# Patient Record
Sex: MALE | Race: WHITE | Employment: FULL TIME | URBAN - NONMETROPOLITAN AREA
[De-identification: names, ages, dates, MRNs, and addresses within clinical notes are randomized per-mention and may not be internally consistent; named-entity substitution may affect disease eponyms.]

---

## 2017-05-10 DIAGNOSIS — T78.40XD ALLERGIC REACTION, SUBSEQUENT ENCOUNTER: ICD-10-CM

## 2017-05-10 RX ORDER — CETIRIZINE HYDROCHLORIDE 10 MG/1
10 TABLET ORAL DAILY
Qty: 30 TABLET | Refills: 1 | Status: SHIPPED | OUTPATIENT
Start: 2017-05-10 | End: 2017-06-09 | Stop reason: SDUPTHER

## 2017-05-10 RX ORDER — MONTELUKAST SODIUM 10 MG/1
10 TABLET ORAL DAILY
Qty: 30 TABLET | Refills: 1 | Status: SHIPPED | OUTPATIENT
Start: 2017-05-10 | End: 2017-06-09 | Stop reason: SDUPTHER

## 2017-05-24 ENCOUNTER — OFFICE VISIT (OUTPATIENT)
Dept: PRIMARY CARE CLINIC | Age: 37
End: 2017-05-24
Payer: COMMERCIAL

## 2017-05-24 VITALS — OXYGEN SATURATION: 98 % | SYSTOLIC BLOOD PRESSURE: 154 MMHG | HEART RATE: 130 BPM | DIASTOLIC BLOOD PRESSURE: 92 MMHG

## 2017-05-24 DIAGNOSIS — T78.2XXA ANAPHYLAXIS, INITIAL ENCOUNTER: Primary | ICD-10-CM

## 2017-05-24 DIAGNOSIS — T78.3XXA ANGIOEDEMA, INITIAL ENCOUNTER: ICD-10-CM

## 2017-05-24 PROCEDURE — 96372 THER/PROPH/DIAG INJ SC/IM: CPT | Performed by: NURSE PRACTITIONER

## 2017-05-24 PROCEDURE — 99215 OFFICE O/P EST HI 40 MIN: CPT | Performed by: NURSE PRACTITIONER

## 2017-05-24 RX ORDER — DIPHENHYDRAMINE HYDROCHLORIDE 50 MG/ML
50 INJECTION INTRAMUSCULAR; INTRAVENOUS ONCE
Status: COMPLETED | OUTPATIENT
Start: 2017-05-24 | End: 2017-05-24

## 2017-05-24 RX ORDER — DEXAMETHASONE SODIUM PHOSPHATE 4 MG/ML
10 INJECTION, SOLUTION INTRA-ARTICULAR; INTRALESIONAL; INTRAMUSCULAR; INTRAVENOUS; SOFT TISSUE ONCE
Status: COMPLETED | OUTPATIENT
Start: 2017-05-24 | End: 2017-05-24

## 2017-05-24 RX ORDER — EPINEPHRINE 0.3 MG/.3ML
0.3 INJECTION SUBCUTANEOUS ONCE
Status: COMPLETED | OUTPATIENT
Start: 2017-05-24 | End: 2017-05-24

## 2017-05-24 RX ADMIN — Medication 50 MG: at 16:11

## 2017-05-24 RX ADMIN — DEXAMETHASONE SODIUM PHOSPHATE 10 MG: 4 INJECTION, SOLUTION INTRA-ARTICULAR; INTRALESIONAL; INTRAMUSCULAR; INTRAVENOUS; SOFT TISSUE at 15:59

## 2017-05-24 RX ADMIN — DIPHENHYDRAMINE HYDROCHLORIDE 50 MG: 50 INJECTION INTRAMUSCULAR; INTRAVENOUS at 16:06

## 2017-05-24 RX ADMIN — EPINEPHRINE 0.3 MG: 0.3 INJECTION SUBCUTANEOUS at 16:12

## 2017-05-24 ASSESSMENT — ENCOUNTER SYMPTOMS
SHORTNESS OF BREATH: 1
VOICE CHANGE: 1

## 2017-06-09 DIAGNOSIS — T78.40XD ALLERGIC REACTION, SUBSEQUENT ENCOUNTER: ICD-10-CM

## 2017-06-09 RX ORDER — MONTELUKAST SODIUM 10 MG/1
10 TABLET ORAL DAILY
Qty: 30 TABLET | Refills: 1 | Status: SHIPPED | OUTPATIENT
Start: 2017-06-09 | End: 2017-06-14 | Stop reason: SDUPTHER

## 2017-06-09 RX ORDER — CETIRIZINE HYDROCHLORIDE 10 MG/1
10 TABLET ORAL DAILY
Qty: 30 TABLET | Refills: 1 | Status: SHIPPED | OUTPATIENT
Start: 2017-06-09 | End: 2017-06-14 | Stop reason: SDUPTHER

## 2017-06-14 ENCOUNTER — OFFICE VISIT (OUTPATIENT)
Dept: PRIMARY CARE CLINIC | Age: 37
End: 2017-06-14
Payer: COMMERCIAL

## 2017-06-14 VITALS
TEMPERATURE: 96.8 F | HEART RATE: 70 BPM | OXYGEN SATURATION: 97 % | WEIGHT: 315 LBS | DIASTOLIC BLOOD PRESSURE: 84 MMHG | SYSTOLIC BLOOD PRESSURE: 132 MMHG | BODY MASS INDEX: 41.75 KG/M2 | HEIGHT: 73 IN

## 2017-06-14 DIAGNOSIS — B37.9 YEAST INFECTION: ICD-10-CM

## 2017-06-14 DIAGNOSIS — R21 RASH: ICD-10-CM

## 2017-06-14 DIAGNOSIS — T78.40XD ALLERGIC REACTION, SUBSEQUENT ENCOUNTER: Primary | ICD-10-CM

## 2017-06-14 PROCEDURE — 99213 OFFICE O/P EST LOW 20 MIN: CPT | Performed by: NURSE PRACTITIONER

## 2017-06-14 RX ORDER — CETIRIZINE HYDROCHLORIDE 10 MG/1
10 TABLET ORAL DAILY
Qty: 30 TABLET | Refills: 11 | Status: SHIPPED | OUTPATIENT
Start: 2017-06-14 | End: 2018-05-31 | Stop reason: SDUPTHER

## 2017-06-14 RX ORDER — PREDNISONE 10 MG/1
TABLET ORAL
Qty: 43 TABLET | Refills: 0 | Status: SHIPPED | OUTPATIENT
Start: 2017-06-14 | End: 2017-08-08 | Stop reason: ALTCHOICE

## 2017-06-14 RX ORDER — MONTELUKAST SODIUM 10 MG/1
10 TABLET ORAL DAILY
Qty: 30 TABLET | Refills: 11 | Status: SHIPPED | OUTPATIENT
Start: 2017-06-14 | End: 2018-05-31 | Stop reason: SDUPTHER

## 2017-06-14 RX ORDER — EPINEPHRINE 0.3 MG/.3ML
0.3 INJECTION SUBCUTANEOUS ONCE
Qty: 0.3 ML | Refills: 3 | Status: SHIPPED | OUTPATIENT
Start: 2017-06-14 | End: 2022-06-14

## 2017-06-14 RX ORDER — RANITIDINE 300 MG/1
300 TABLET ORAL NIGHTLY
Qty: 30 TABLET | Refills: 11 | Status: SHIPPED | OUTPATIENT
Start: 2017-06-14 | End: 2018-04-25 | Stop reason: ALTCHOICE

## 2017-06-14 RX ORDER — NYSTATIN 100000 [USP'U]/G
POWDER TOPICAL 3 TIMES DAILY
Qty: 1 BOTTLE | Refills: 3 | Status: SHIPPED | OUTPATIENT
Start: 2017-06-14 | End: 2020-04-21 | Stop reason: SDUPTHER

## 2017-06-14 ASSESSMENT — ENCOUNTER SYMPTOMS
DIARRHEA: 0
WHEEZING: 0
COUGH: 0
NAUSEA: 0
EYE PAIN: 0
SORE THROAT: 0
EYE REDNESS: 0
BLOOD IN STOOL: 0
CONSTIPATION: 0
ABDOMINAL PAIN: 0
EYE ITCHING: 0
SINUS PRESSURE: 0
TROUBLE SWALLOWING: 0
VOMITING: 0
SHORTNESS OF BREATH: 0
EYE DISCHARGE: 0
CHEST TIGHTNESS: 0
BACK PAIN: 0

## 2017-06-23 RX ORDER — METHYLPREDNISOLONE 4 MG/1
TABLET ORAL
Qty: 1 KIT | Refills: 0 | Status: SHIPPED | OUTPATIENT
Start: 2017-06-23 | End: 2017-06-29

## 2017-08-03 ENCOUNTER — TELEPHONE (OUTPATIENT)
Dept: PRIMARY CARE CLINIC | Age: 37
End: 2017-08-03

## 2017-08-08 ENCOUNTER — OFFICE VISIT (OUTPATIENT)
Dept: PRIMARY CARE CLINIC | Age: 37
End: 2017-08-08
Payer: COMMERCIAL

## 2017-08-08 VITALS
BODY MASS INDEX: 41.75 KG/M2 | DIASTOLIC BLOOD PRESSURE: 86 MMHG | HEIGHT: 73 IN | TEMPERATURE: 98.7 F | WEIGHT: 315 LBS | HEART RATE: 76 BPM | SYSTOLIC BLOOD PRESSURE: 134 MMHG | OXYGEN SATURATION: 98 %

## 2017-08-08 DIAGNOSIS — J30.81 ALLERGIC RHINITIS DUE TO ANIMAL HAIR AND DANDER: Primary | ICD-10-CM

## 2017-08-08 DIAGNOSIS — L50.9 URTICARIAL RASH: ICD-10-CM

## 2017-08-08 PROCEDURE — 99213 OFFICE O/P EST LOW 20 MIN: CPT | Performed by: NURSE PRACTITIONER

## 2017-08-08 PROCEDURE — 96372 THER/PROPH/DIAG INJ SC/IM: CPT | Performed by: NURSE PRACTITIONER

## 2017-08-08 RX ORDER — DEXAMETHASONE SODIUM PHOSPHATE 4 MG/ML
10 INJECTION, SOLUTION INTRA-ARTICULAR; INTRALESIONAL; INTRAMUSCULAR; INTRAVENOUS; SOFT TISSUE ONCE
Status: COMPLETED | OUTPATIENT
Start: 2017-08-08 | End: 2017-08-08

## 2017-08-08 RX ORDER — HYDROXYZINE PAMOATE 25 MG/1
25 CAPSULE ORAL 3 TIMES DAILY PRN
Qty: 60 CAPSULE | Refills: 1 | Status: SHIPPED | OUTPATIENT
Start: 2017-08-08 | End: 2017-08-22

## 2017-08-08 RX ORDER — PREDNISONE 10 MG/1
10 TABLET ORAL DAILY
Qty: 42 TABLET | Refills: 0 | Status: SHIPPED | OUTPATIENT
Start: 2017-08-08 | End: 2017-08-18

## 2017-08-08 RX ADMIN — DEXAMETHASONE SODIUM PHOSPHATE 10 MG: 4 INJECTION, SOLUTION INTRA-ARTICULAR; INTRALESIONAL; INTRAMUSCULAR; INTRAVENOUS; SOFT TISSUE at 09:04

## 2017-08-08 ASSESSMENT — ENCOUNTER SYMPTOMS
VOMITING: 0
CONSTIPATION: 0
COUGH: 0
RHINORRHEA: 0
DIARRHEA: 0
EYE REDNESS: 0
SORE THROAT: 0
SHORTNESS OF BREATH: 0

## 2017-08-21 ENCOUNTER — TELEPHONE (OUTPATIENT)
Dept: PRIMARY CARE CLINIC | Age: 37
End: 2017-08-21

## 2017-08-31 ENCOUNTER — TELEPHONE (OUTPATIENT)
Dept: PRIMARY CARE CLINIC | Age: 37
End: 2017-08-31

## 2018-04-25 ENCOUNTER — OFFICE VISIT (OUTPATIENT)
Dept: PRIMARY CARE CLINIC | Age: 38
End: 2018-04-25
Payer: COMMERCIAL

## 2018-04-25 VITALS
SYSTOLIC BLOOD PRESSURE: 136 MMHG | BODY MASS INDEX: 41.75 KG/M2 | TEMPERATURE: 97.5 F | DIASTOLIC BLOOD PRESSURE: 86 MMHG | HEIGHT: 73 IN | WEIGHT: 315 LBS | OXYGEN SATURATION: 96 % | HEART RATE: 83 BPM

## 2018-04-25 DIAGNOSIS — B02.9 HERPES ZOSTER WITHOUT COMPLICATION: Primary | ICD-10-CM

## 2018-04-25 PROCEDURE — 99213 OFFICE O/P EST LOW 20 MIN: CPT | Performed by: PEDIATRICS

## 2018-04-25 RX ORDER — PREGABALIN 75 MG/1
75 CAPSULE ORAL 2 TIMES DAILY
Qty: 28 CAPSULE | Refills: 0 | Status: SHIPPED | OUTPATIENT
Start: 2018-04-25 | End: 2018-05-31 | Stop reason: ALTCHOICE

## 2018-04-25 RX ORDER — VALACYCLOVIR HYDROCHLORIDE 1 G/1
1000 TABLET, FILM COATED ORAL 3 TIMES DAILY
Qty: 21 TABLET | Refills: 0 | Status: SHIPPED | OUTPATIENT
Start: 2018-04-25 | End: 2018-05-31 | Stop reason: ALTCHOICE

## 2018-04-25 ASSESSMENT — ENCOUNTER SYMPTOMS
ALLERGIC/IMMUNOLOGIC NEGATIVE: 1
GASTROINTESTINAL NEGATIVE: 1
RESPIRATORY NEGATIVE: 1
EYES NEGATIVE: 1

## 2018-05-31 ENCOUNTER — OFFICE VISIT (OUTPATIENT)
Dept: PRIMARY CARE CLINIC | Age: 38
End: 2018-05-31
Payer: COMMERCIAL

## 2018-05-31 VITALS
TEMPERATURE: 97.8 F | SYSTOLIC BLOOD PRESSURE: 141 MMHG | HEIGHT: 73 IN | DIASTOLIC BLOOD PRESSURE: 85 MMHG | BODY MASS INDEX: 41.75 KG/M2 | OXYGEN SATURATION: 95 % | WEIGHT: 315 LBS | HEART RATE: 118 BPM

## 2018-05-31 DIAGNOSIS — L50.9 URTICARIAL RASH: ICD-10-CM

## 2018-05-31 DIAGNOSIS — Z91.09 ENVIRONMENTAL ALLERGIES: Primary | ICD-10-CM

## 2018-05-31 DIAGNOSIS — T78.40XD ALLERGIC REACTION, SUBSEQUENT ENCOUNTER: ICD-10-CM

## 2018-05-31 PROCEDURE — 99213 OFFICE O/P EST LOW 20 MIN: CPT | Performed by: NURSE PRACTITIONER

## 2018-05-31 PROCEDURE — 96372 THER/PROPH/DIAG INJ SC/IM: CPT | Performed by: NURSE PRACTITIONER

## 2018-05-31 RX ORDER — CETIRIZINE HYDROCHLORIDE 10 MG/1
10 TABLET ORAL DAILY
Qty: 30 TABLET | Refills: 11 | Status: SHIPPED | OUTPATIENT
Start: 2018-05-31 | End: 2019-06-14 | Stop reason: SDUPTHER

## 2018-05-31 RX ORDER — DEXAMETHASONE SODIUM PHOSPHATE 10 MG/ML
10 INJECTION INTRAMUSCULAR; INTRAVENOUS ONCE
Status: COMPLETED | OUTPATIENT
Start: 2018-05-31 | End: 2018-05-31

## 2018-05-31 RX ORDER — PREDNISONE 10 MG/1
10 TABLET ORAL DAILY
Qty: 42 TABLET | Refills: 0 | Status: SHIPPED | OUTPATIENT
Start: 2018-05-31 | End: 2019-01-02 | Stop reason: SDUPTHER

## 2018-05-31 RX ORDER — MONTELUKAST SODIUM 10 MG/1
10 TABLET ORAL DAILY
Qty: 30 TABLET | Refills: 11 | Status: SHIPPED | OUTPATIENT
Start: 2018-05-31 | End: 2019-06-14 | Stop reason: SDUPTHER

## 2018-05-31 RX ORDER — FLUTICASONE PROPIONATE 50 MCG
SPRAY, SUSPENSION (ML) NASAL
COMMUNITY
Start: 2018-04-24 | End: 2019-09-19 | Stop reason: SDUPTHER

## 2018-05-31 RX ADMIN — DEXAMETHASONE SODIUM PHOSPHATE 10 MG: 10 INJECTION INTRAMUSCULAR; INTRAVENOUS at 08:36

## 2018-05-31 ASSESSMENT — ENCOUNTER SYMPTOMS
CONSTIPATION: 0
DIARRHEA: 0
SHORTNESS OF BREATH: 0
COUGH: 0
EYE REDNESS: 0
RHINORRHEA: 0
SORE THROAT: 0
VOMITING: 0

## 2018-06-12 ENCOUNTER — OFFICE VISIT (OUTPATIENT)
Dept: PRIMARY CARE CLINIC | Age: 38
End: 2018-06-12
Payer: COMMERCIAL

## 2018-06-12 VITALS
BODY MASS INDEX: 41.75 KG/M2 | WEIGHT: 315 LBS | OXYGEN SATURATION: 98 % | DIASTOLIC BLOOD PRESSURE: 79 MMHG | HEART RATE: 67 BPM | TEMPERATURE: 98.2 F | HEIGHT: 73 IN | SYSTOLIC BLOOD PRESSURE: 136 MMHG

## 2018-06-12 DIAGNOSIS — Z11.4 ENCOUNTER FOR SCREENING FOR HIV: ICD-10-CM

## 2018-06-12 DIAGNOSIS — Z00.00 ENCOUNTER FOR PREVENTIVE HEALTH EXAMINATION: Primary | ICD-10-CM

## 2018-06-12 DIAGNOSIS — Z11.59 NEED FOR HEPATITIS B SCREENING TEST: ICD-10-CM

## 2018-06-12 DIAGNOSIS — Z00.00 ENCOUNTER FOR PREVENTIVE HEALTH EXAMINATION: ICD-10-CM

## 2018-06-12 DIAGNOSIS — J30.89 CHRONIC NONSEASONAL ALLERGIC RHINITIS DUE TO POLLEN: ICD-10-CM

## 2018-06-12 DIAGNOSIS — Z23 NEED FOR HEPATITIS A IMMUNIZATION: ICD-10-CM

## 2018-06-12 LAB
ALBUMIN SERPL-MCNC: 4.2 G/DL (ref 3.5–5.2)
ALP BLD-CCNC: 92 U/L (ref 40–130)
ALT SERPL-CCNC: 22 U/L (ref 5–41)
ANION GAP SERPL CALCULATED.3IONS-SCNC: 15 MMOL/L (ref 7–19)
AST SERPL-CCNC: 24 U/L (ref 5–40)
BASOPHILS ABSOLUTE: 0 K/UL (ref 0–0.2)
BASOPHILS RELATIVE PERCENT: 0.6 % (ref 0–1)
BILIRUB SERPL-MCNC: 0.4 MG/DL (ref 0.2–1.2)
BUN BLDV-MCNC: 10 MG/DL (ref 6–20)
CALCIUM SERPL-MCNC: 8.9 MG/DL (ref 8.6–10)
CHLORIDE BLD-SCNC: 103 MMOL/L (ref 98–111)
CHOLESTEROL, TOTAL: 170 MG/DL (ref 160–199)
CO2: 23 MMOL/L (ref 22–29)
CREAT SERPL-MCNC: 0.7 MG/DL (ref 0.5–1.2)
EOSINOPHILS ABSOLUTE: 0.2 K/UL (ref 0–0.6)
EOSINOPHILS RELATIVE PERCENT: 3.8 % (ref 0–5)
GFR NON-AFRICAN AMERICAN: >60
GLUCOSE BLD-MCNC: 89 MG/DL (ref 74–109)
HCT VFR BLD CALC: 44.6 % (ref 42–52)
HDLC SERPL-MCNC: 54 MG/DL (ref 55–121)
HEMOGLOBIN: 14.4 G/DL (ref 14–18)
LDL CHOLESTEROL CALCULATED: 87 MG/DL
LYMPHOCYTES ABSOLUTE: 1.3 K/UL (ref 1.1–4.5)
LYMPHOCYTES RELATIVE PERCENT: 23.9 % (ref 20–40)
MCH RBC QN AUTO: 28.9 PG (ref 27–31)
MCHC RBC AUTO-ENTMCNC: 32.3 G/DL (ref 33–37)
MCV RBC AUTO: 89.4 FL (ref 80–94)
MONOCYTES ABSOLUTE: 0.4 K/UL (ref 0–0.9)
MONOCYTES RELATIVE PERCENT: 7.6 % (ref 0–10)
NEUTROPHILS ABSOLUTE: 3.3 K/UL (ref 1.5–7.5)
NEUTROPHILS RELATIVE PERCENT: 63.7 % (ref 50–65)
PDW BLD-RTO: 12.6 % (ref 11.5–14.5)
PLATELET # BLD: 204 K/UL (ref 130–400)
PMV BLD AUTO: 10.4 FL (ref 9.4–12.4)
POTASSIUM SERPL-SCNC: 4.3 MMOL/L (ref 3.5–5)
RAPID HIV 1&2: NORMAL
RBC # BLD: 4.99 M/UL (ref 4.7–6.1)
SODIUM BLD-SCNC: 141 MMOL/L (ref 136–145)
T4 FREE: 1.2 NG/DL (ref 0.9–1.7)
TOTAL PROTEIN: 7 G/DL (ref 6.6–8.7)
TRIGL SERPL-MCNC: 146 MG/DL (ref 0–149)
TSH SERPL DL<=0.05 MIU/L-ACNC: 1.2 UIU/ML (ref 0.27–4.2)
WBC # BLD: 5.2 K/UL (ref 4.8–10.8)

## 2018-06-12 PROCEDURE — 90632 HEPA VACCINE ADULT IM: CPT | Performed by: NURSE PRACTITIONER

## 2018-06-12 PROCEDURE — 90471 IMMUNIZATION ADMIN: CPT | Performed by: NURSE PRACTITIONER

## 2018-06-12 PROCEDURE — 99395 PREV VISIT EST AGE 18-39: CPT | Performed by: NURSE PRACTITIONER

## 2018-06-12 ASSESSMENT — PATIENT HEALTH QUESTIONNAIRE - PHQ9
2. FEELING DOWN, DEPRESSED OR HOPELESS: 0
SUM OF ALL RESPONSES TO PHQ QUESTIONS 1-9: 0
1. LITTLE INTEREST OR PLEASURE IN DOING THINGS: 0
SUM OF ALL RESPONSES TO PHQ9 QUESTIONS 1 & 2: 0

## 2018-06-12 ASSESSMENT — ENCOUNTER SYMPTOMS
SORE THROAT: 0
RHINORRHEA: 0
BACK PAIN: 0
VOMITING: 0
CONSTIPATION: 0
DIARRHEA: 0
COUGH: 0
SHORTNESS OF BREATH: 0
EYE REDNESS: 0

## 2018-06-13 LAB — HBV SURFACE AB TITR SER: NORMAL {TITER}

## 2018-06-14 ENCOUNTER — TELEPHONE (OUTPATIENT)
Dept: PRIMARY CARE CLINIC | Age: 38
End: 2018-06-14

## 2019-01-02 ENCOUNTER — OFFICE VISIT (OUTPATIENT)
Dept: PRIMARY CARE CLINIC | Age: 39
End: 2019-01-02
Payer: COMMERCIAL

## 2019-01-02 VITALS
SYSTOLIC BLOOD PRESSURE: 138 MMHG | HEIGHT: 73 IN | BODY MASS INDEX: 41.75 KG/M2 | HEART RATE: 80 BPM | TEMPERATURE: 97.3 F | WEIGHT: 315 LBS | OXYGEN SATURATION: 98 % | DIASTOLIC BLOOD PRESSURE: 88 MMHG

## 2019-01-02 DIAGNOSIS — K21.9 GASTROESOPHAGEAL REFLUX DISEASE, ESOPHAGITIS PRESENCE NOT SPECIFIED: ICD-10-CM

## 2019-01-02 DIAGNOSIS — J30.1 NON-SEASONAL ALLERGIC RHINITIS DUE TO POLLEN: Primary | ICD-10-CM

## 2019-01-02 DIAGNOSIS — Z91.09 ENVIRONMENTAL ALLERGIES: ICD-10-CM

## 2019-01-02 PROCEDURE — 99213 OFFICE O/P EST LOW 20 MIN: CPT | Performed by: NURSE PRACTITIONER

## 2019-01-02 RX ORDER — EPINASTINE HCL 0.05 %
DROPS OPHTHALMIC (EYE)
Refills: 6 | COMMUNITY
Start: 2018-11-14 | End: 2019-09-19 | Stop reason: SDUPTHER

## 2019-01-02 RX ORDER — PREDNISONE 10 MG/1
10 TABLET ORAL DAILY
Qty: 42 TABLET | Refills: 0 | Status: SHIPPED | OUTPATIENT
Start: 2019-01-02 | End: 2019-04-16 | Stop reason: SDUPTHER

## 2019-01-02 ASSESSMENT — ENCOUNTER SYMPTOMS
EYE REDNESS: 0
SHORTNESS OF BREATH: 0
CONSTIPATION: 0
VOMITING: 0
DIARRHEA: 0
COUGH: 1
RHINORRHEA: 1
SORE THROAT: 0

## 2019-01-03 ENCOUNTER — TELEPHONE (OUTPATIENT)
Dept: PRIMARY CARE CLINIC | Age: 39
End: 2019-01-03

## 2019-01-03 RX ORDER — PANTOPRAZOLE SODIUM 40 MG/1
40 TABLET, DELAYED RELEASE ORAL
Qty: 30 TABLET | Refills: 11 | Status: SHIPPED | OUTPATIENT
Start: 2019-01-03 | End: 2019-09-19 | Stop reason: SDUPTHER

## 2019-01-04 ENCOUNTER — OFFICE VISIT (OUTPATIENT)
Dept: PRIMARY CARE CLINIC | Age: 39
End: 2019-01-04
Payer: COMMERCIAL

## 2019-01-04 VITALS
DIASTOLIC BLOOD PRESSURE: 93 MMHG | HEIGHT: 73 IN | OXYGEN SATURATION: 98 % | WEIGHT: 315 LBS | HEART RATE: 84 BPM | TEMPERATURE: 98.4 F | SYSTOLIC BLOOD PRESSURE: 140 MMHG | BODY MASS INDEX: 41.75 KG/M2

## 2019-01-04 DIAGNOSIS — R07.89 CHEST PRESSURE: ICD-10-CM

## 2019-01-04 DIAGNOSIS — K21.9 GASTROESOPHAGEAL REFLUX DISEASE, ESOPHAGITIS PRESENCE NOT SPECIFIED: ICD-10-CM

## 2019-01-04 DIAGNOSIS — R09.1 PLEURISY: ICD-10-CM

## 2019-01-04 DIAGNOSIS — R05.9 COUGH: Primary | ICD-10-CM

## 2019-01-04 PROCEDURE — 96372 THER/PROPH/DIAG INJ SC/IM: CPT | Performed by: NURSE PRACTITIONER

## 2019-01-04 PROCEDURE — 93000 ELECTROCARDIOGRAM COMPLETE: CPT | Performed by: NURSE PRACTITIONER

## 2019-01-04 PROCEDURE — 99213 OFFICE O/P EST LOW 20 MIN: CPT | Performed by: NURSE PRACTITIONER

## 2019-01-04 RX ORDER — DEXAMETHASONE SODIUM PHOSPHATE 10 MG/ML
10 INJECTION INTRAMUSCULAR; INTRAVENOUS ONCE
Status: COMPLETED | OUTPATIENT
Start: 2019-01-04 | End: 2019-01-04

## 2019-01-04 RX ADMIN — DEXAMETHASONE SODIUM PHOSPHATE 10 MG: 10 INJECTION INTRAMUSCULAR; INTRAVENOUS at 14:32

## 2019-01-04 ASSESSMENT — ENCOUNTER SYMPTOMS
COUGH: 1
DIARRHEA: 0
CONSTIPATION: 0
RHINORRHEA: 0
SORE THROAT: 0
SHORTNESS OF BREATH: 1
VOMITING: 0
WHEEZING: 0
EYE REDNESS: 0

## 2019-01-18 ENCOUNTER — OFFICE VISIT (OUTPATIENT)
Dept: PRIMARY CARE CLINIC | Age: 39
End: 2019-01-18
Payer: COMMERCIAL

## 2019-01-18 VITALS
HEIGHT: 73 IN | HEART RATE: 74 BPM | DIASTOLIC BLOOD PRESSURE: 84 MMHG | WEIGHT: 315 LBS | SYSTOLIC BLOOD PRESSURE: 138 MMHG | TEMPERATURE: 97.4 F | BODY MASS INDEX: 41.75 KG/M2 | OXYGEN SATURATION: 97 %

## 2019-01-18 DIAGNOSIS — K21.9 GASTROESOPHAGEAL REFLUX DISEASE, ESOPHAGITIS PRESENCE NOT SPECIFIED: Primary | ICD-10-CM

## 2019-01-18 DIAGNOSIS — R06.02 SHORTNESS OF BREATH: ICD-10-CM

## 2019-01-18 PROCEDURE — 99213 OFFICE O/P EST LOW 20 MIN: CPT | Performed by: NURSE PRACTITIONER

## 2019-01-18 ASSESSMENT — ENCOUNTER SYMPTOMS
EYE REDNESS: 0
WHEEZING: 0
RHINORRHEA: 0
DIARRHEA: 0
COUGH: 0
SORE THROAT: 0
VOMITING: 0
SHORTNESS OF BREATH: 0
CONSTIPATION: 0

## 2019-04-16 ENCOUNTER — TELEPHONE (OUTPATIENT)
Dept: PRIMARY CARE CLINIC | Age: 39
End: 2019-04-16

## 2019-04-16 DIAGNOSIS — Z91.09 ENVIRONMENTAL ALLERGIES: ICD-10-CM

## 2019-04-16 RX ORDER — PREDNISONE 10 MG/1
10 TABLET ORAL DAILY
Qty: 42 TABLET | Refills: 0 | Status: SHIPPED | OUTPATIENT
Start: 2019-04-16 | End: 2019-04-24 | Stop reason: ALTCHOICE

## 2019-04-24 ENCOUNTER — OFFICE VISIT (OUTPATIENT)
Dept: PRIMARY CARE CLINIC | Age: 39
End: 2019-04-24
Payer: COMMERCIAL

## 2019-04-24 VITALS
TEMPERATURE: 97.5 F | BODY MASS INDEX: 41.75 KG/M2 | DIASTOLIC BLOOD PRESSURE: 99 MMHG | WEIGHT: 315 LBS | HEART RATE: 71 BPM | HEIGHT: 73 IN | SYSTOLIC BLOOD PRESSURE: 157 MMHG | OXYGEN SATURATION: 98 %

## 2019-04-24 DIAGNOSIS — B02.9 HERPES ZOSTER WITHOUT COMPLICATION: Primary | ICD-10-CM

## 2019-04-24 PROCEDURE — 99213 OFFICE O/P EST LOW 20 MIN: CPT | Performed by: NURSE PRACTITIONER

## 2019-04-24 RX ORDER — PREGABALIN 75 MG/1
75 CAPSULE ORAL 2 TIMES DAILY
Qty: 28 CAPSULE | Refills: 0 | Status: SHIPPED | OUTPATIENT
Start: 2019-04-24 | End: 2019-09-19 | Stop reason: ALTCHOICE

## 2019-04-24 RX ORDER — VALACYCLOVIR HYDROCHLORIDE 1 G/1
1000 TABLET, FILM COATED ORAL 3 TIMES DAILY
Qty: 21 TABLET | Refills: 0 | Status: SHIPPED | OUTPATIENT
Start: 2019-04-24 | End: 2019-09-19 | Stop reason: ALTCHOICE

## 2019-04-24 ASSESSMENT — ENCOUNTER SYMPTOMS
DIARRHEA: 0
RHINORRHEA: 0
CONSTIPATION: 0
VOMITING: 0
SORE THROAT: 0
COUGH: 0
EYE REDNESS: 0
SHORTNESS OF BREATH: 0

## 2019-04-24 NOTE — PROGRESS NOTES
Jaylon Carlin Aponte  Phone [phone]   Fax [phone]      OFFICE VISIT: 2019    Jorge Dandy- : 1980    Chief Candy Nash is a 45 y.o. male who is here for Rash     HPI  The patient presents today for evaluation of a rash. He awoke with a rash on his left forehead this morning. He reports the left forehead region is painful. There was some discomfort in the area yesterday. He had shingles in the same area 2018.     height is 6' 1\" (1.854 m) and weight is 358 lb (162.4 kg) (abnormal). His temporal temperature is 97.5 °F (36.4 °C). His blood pressure is 157/99 (abnormal) and his pulse is 71. His oxygen saturation is 98%. Body mass index is 47.23 kg/m².     Results for orders placed or performed in visit on 18   CBC Auto Differential   Result Value Ref Range    WBC 5.2 4.8 - 10.8 K/uL    RBC 4.99 4.70 - 6.10 M/uL    Hemoglobin 14.4 14.0 - 18.0 g/dL    Hematocrit 44.6 42.0 - 52.0 %    MCV 89.4 80.0 - 94.0 fL    MCH 28.9 27.0 - 31.0 pg    MCHC 32.3 (L) 33.0 - 37.0 g/dL    RDW 12.6 11.5 - 14.5 %    Platelets 078 611 - 786 K/uL    MPV 10.4 9.4 - 12.4 fL    Neutrophils % 63.7 50.0 - 65.0 %    Lymphocytes % 23.9 20.0 - 40.0 %    Monocytes % 7.6 0.0 - 10.0 %    Eosinophils % 3.8 0.0 - 5.0 %    Basophils % 0.6 0.0 - 1.0 %    Neutrophils # 3.3 1.5 - 7.5 K/uL    Lymphocytes # 1.3 1.1 - 4.5 K/uL    Monocytes # 0.40 0.00 - 0.90 K/uL    Eosinophils # 0.20 0.00 - 0.60 K/uL    Basophils # 0.00 0.00 - 0.20 K/uL   Comprehensive Metabolic Panel   Result Value Ref Range    Sodium 141 136 - 145 mmol/L    Potassium 4.3 3.5 - 5.0 mmol/L    Chloride 103 98 - 111 mmol/L    CO2 23 22 - 29 mmol/L    Anion Gap 15 7 - 19 mmol/L    Glucose 89 74 - 109 mg/dL    BUN 10 6 - 20 mg/dL    CREATININE 0.7 0.5 - 1.2 mg/dL    GFR Non-African American >60 >60    Calcium 8.9 8.6 - 10.0 mg/dL    Total Protein 7.0 6.6 - 8.7 g/dL    Alb 4.2 3.5 - 5.2 g/dL    Total Bilirubin 0.4 0.2 - 1.2 mg/dL Alkaline Phosphatase 92 40 - 130 U/L    ALT 22 5 - 41 U/L    AST 24 5 - 40 U/L   Lipid Panel   Result Value Ref Range    Cholesterol, Total 170 160 - 199 mg/dL    Triglycerides 146 0 - 149 mg/dL    HDL 54 (L) 55 - 121 mg/dL    LDL Calculated 87 <100 mg/dL   T4, Free   Result Value Ref Range    T4 Free 1.2 0.9 - 1.7 ng/dL   TSH without Reflex   Result Value Ref Range    TSH 1.200 0.270 - 4.200 uIU/mL   Hepatitis B Surface Antibody   Result Value Ref Range    Hep B S Ab Non-Reactive    HIV Rapid 1&2   Result Value Ref Range    Rapid HIV 1&2 Non-reactive Non-reactive     have reviewed the following with the Mr. Ashlyn Cain   Lab Review   No visits with results within 6 Month(s) from this visit.    Latest known visit with results is:   Orders Only on 06/12/2018   Component Date Value    WBC 06/12/2018 5.2     RBC 06/12/2018 4.99     Hemoglobin 06/12/2018 14.4     Hematocrit 06/12/2018 44.6     MCV 06/12/2018 89.4     MCH 06/12/2018 28.9     MCHC 06/12/2018 32.3*    RDW 06/12/2018 12.6     Platelets 29/14/3793 204     MPV 06/12/2018 10.4     Neutrophils % 06/12/2018 63.7     Lymphocytes % 06/12/2018 23.9     Monocytes % 06/12/2018 7.6     Eosinophils % 06/12/2018 3.8     Basophils % 06/12/2018 0.6     Neutrophils # 06/12/2018 3.3     Lymphocytes # 06/12/2018 1.3     Monocytes # 06/12/2018 0.40     Eosinophils # 06/12/2018 0.20     Basophils # 06/12/2018 0.00     Sodium 06/12/2018 141     Potassium 06/12/2018 4.3     Chloride 06/12/2018 103     CO2 06/12/2018 23     Anion Gap 06/12/2018 15     Glucose 06/12/2018 89     BUN 06/12/2018 10     CREATININE 06/12/2018 0.7     GFR Non- 06/12/2018 >60     Calcium 06/12/2018 8.9     Total Protein 06/12/2018 7.0     Alb 06/12/2018 4.2     Total Bilirubin 06/12/2018 0.4     Alkaline Phosphatase 06/12/2018 92     ALT 06/12/2018 22     AST 06/12/2018 24     Cholesterol, Total 06/12/2018 170     Triglycerides 06/12/2018 146     HDL 06/12/2018 54*    LDL Calculated 06/12/2018 87     T4 Free 06/12/2018 1.2     TSH 06/12/2018 1.200     Hep B S Ab 06/12/2018 Non-Reactive     Rapid HIV 1&2 06/12/2018 Non-reactive      Copies of these are in the chart. Prior to Visit Medications    Medication Sig Taking? Authorizing Provider   valACYclovir (VALTREX) 1 g tablet Take 1 tablet by mouth 3 times daily Yes LASHONDA Arrieta   pregabalin (LYRICA) 75 MG capsule Take 1 capsule by mouth 2 times daily for 14 days. Yes LASHONDA Arrieta   pantoprazole (PROTONIX) 40 MG tablet Take 1 tablet by mouth every morning (before breakfast) Yes GINGER Clent Latus, DO   epinastine (ELESTAT) 0.05 % SOLN  Yes Historical Provider, MD   mometasone-formoterol (DULERA) 100-5 MCG/ACT inhaler Inhale 2 puffs into the lungs 2 times daily Yes LASHONDA Arrieta   fluticasone (FLONASE) 50 MCG/ACT nasal spray  Yes Historical Provider, MD   montelukast (SINGULAIR) 10 MG tablet Take 1 tablet by mouth daily Yes LASHONDA Arrieta   cetirizine (ZYRTEC) 10 MG tablet Take 1 tablet by mouth daily Yes LASHONDA Arrieta   nystatin (MYCOSTATIN) 003407 UNIT/GM powder Apply topically 3 times daily Apply 3 times daily. Patient taking differently: Apply topically 3 times daily as needed Apply 3 times daily. Yes LASHONDA Arrieta   EPINEPHrine (EPIPEN 2-KEIKO) 0.3 MG/0.3ML SOAJ injection Inject 0.3 mLs into the skin once for 1 dose Use as directed for allergic reaction Yes LASHONDA Arrieta       Allergies: Cat hair extract; Dog epithelium; Penicillins; and Meat extract    No past medical history on file. Past Surgical History:   Procedure Laterality Date    GASTRIC BYPASS SURGERY         Social History     Tobacco Use    Smoking status: Never Smoker    Smokeless tobacco: Never Used   Substance Use Topics    Alcohol use: Yes     Comment: rarely       No family history on file. Review of Systems   Constitutional: Negative for chills, fatigue and fever.    HENT: Negative for congestion, ear pain, rhinorrhea and sore throat. Eyes: Negative for redness. Respiratory: Negative for cough and shortness of breath. Cardiovascular: Negative for chest pain. Gastrointestinal: Negative for constipation, diarrhea and vomiting. Skin: Positive for rash (left forehead). Neurological: Negative for dizziness and headaches. Physical Exam   Constitutional: He appears well-developed. HENT:   Head: Normocephalic. Right Ear: External ear normal.   Left Ear: External ear normal.   Nose: Nose normal.   Mouth/Throat: Oropharynx is clear and moist.   Neck: Normal range of motion. Cardiovascular: Normal rate and regular rhythm. Pulmonary/Chest: Effort normal and breath sounds normal. No stridor. No respiratory distress. He has no wheezes. He has no rales. Abdominal: Soft. Bowel sounds are normal.   Lymphadenopathy:     He has no cervical adenopathy. Neurological: He is alert. Skin: Skin is dry. Rash (erythematous rash in clusters with vesicles on the left forehead) noted. Vitals reviewed. ASSESSMENT      ICD-10-CM    1. Herpes zoster without complication J58.9 valACYclovir (VALTREX) 1 g tablet     pregabalin (LYRICA) 75 MG capsule         PLAN    No orders of the defined types were placed in this encounter. Return if symptoms worsen or fail to improve. Patient Instructions       Patient Education        Shingles: Care Instructions  Your Care Instructions    Shingles (herpes zoster) causes pain and a blistered rash. The rash can appear anywhere on the body but will be on only one side of the body, the left or right. It will be in a band, a strip, or a small area. The pain can be very severe. Shingles can also cause tingling or itching in the area of the rash. The blisters scab over after a few days and heal in 2 to 4 weeks. Medicines can help you feel better and may help prevent more serious problems caused by shingles.   Shingles is caused by the same virus that causes chickenpox. When you have chickenpox, the virus gets into your nerve roots and stays there (becomes dormant) long after you get over the chickenpox. If the virus becomes active again, it can cause shingles. Follow-up care is a key part of your treatment and safety. Be sure to make and go to all appointments, and call your doctor if you are having problems. It's also a good idea to know your test results and keep a list of the medicines you take. How can you care for yourself at home? · Be safe with medicines. Take your medicines exactly as prescribed. Call your doctor if you think you are having a problem with your medicine. Antiviral medicine helps you get better faster. · Try not to scratch or pick at the blisters. They will crust over and fall off on their own if you leave them alone. · Put cool, wet cloths on the area to relieve pain and itching. You can also use calamine lotion. Try not to use so much lotion that it cakes and is hard to get off. · Put cornstarch or baking soda on the sores to help dry them out so they heal faster. · Do not use thick ointment, such as petroleum jelly, on the sores. This will keep them from drying and healing. · To help remove loose crusts, soak them in tap water. This can help decrease oozing, and dry and soothe the skin. · Take an over-the-counter pain medicine, such as acetaminophen (Tylenol), ibuprofen (Advil, Motrin), or naproxen (Aleve). Read and follow all instructions on the label. · Avoid close contact with people until the blisters have healed. It is very important for you to avoid contact with anyone who has never had chickenpox or the chickenpox vaccine. Pregnant women, young babies, and anyone else who has a hard time fighting infection (such as someone with HIV, diabetes, or cancer) is especially at risk. When should you call for help?   Call your doctor now or seek immediate medical care if:    · You have a new or higher fever.     · You have a

## 2019-04-26 DIAGNOSIS — B02.9 HERPES ZOSTER WITHOUT COMPLICATION: Primary | ICD-10-CM

## 2019-04-26 RX ORDER — GABAPENTIN 300 MG/1
300 CAPSULE ORAL 3 TIMES DAILY
Qty: 30 CAPSULE | Refills: 0 | Status: SHIPPED | OUTPATIENT
Start: 2019-04-26 | End: 2019-09-19 | Stop reason: ALTCHOICE

## 2019-04-26 NOTE — TELEPHONE ENCOUNTER
Pt calls and said that the medication you sent in for him was not covered by the coupon because they said that he had already used a coupon the last time he had shingles so he did not get it because it was over 200.00. It there anything else he can take?

## 2019-06-14 DIAGNOSIS — T78.40XD ALLERGIC REACTION, SUBSEQUENT ENCOUNTER: ICD-10-CM

## 2019-06-14 DIAGNOSIS — Z91.09 ENVIRONMENTAL ALLERGIES: ICD-10-CM

## 2019-06-14 RX ORDER — MONTELUKAST SODIUM 10 MG/1
10 TABLET ORAL DAILY
Qty: 30 TABLET | Refills: 11 | Status: SHIPPED | OUTPATIENT
Start: 2019-06-14 | End: 2019-09-19 | Stop reason: SDUPTHER

## 2019-06-14 RX ORDER — CETIRIZINE HYDROCHLORIDE 10 MG/1
10 TABLET ORAL DAILY
Qty: 30 TABLET | Refills: 11 | Status: SHIPPED | OUTPATIENT
Start: 2019-06-14 | End: 2019-09-19 | Stop reason: SDUPTHER

## 2019-06-14 NOTE — TELEPHONE ENCOUNTER
Pt seen 4/24/19 with no follow up.       Requested Prescriptions     Pending Prescriptions Disp Refills    cetirizine (ZYRTEC) 10 MG tablet [Pharmacy Med Name: CETIRIZINE HCL 10 MG TAB 10 TAB] 30 tablet 11     Sig: TAKE 1 TABLET BY MOUTH DAILY    montelukast (SINGULAIR) 10 MG tablet [Pharmacy Med Name: MONTELUKAST SOD 10 MG TAB 10 TAB] 30 tablet 11     Sig: TAKE 1 TABLET BY MOUTH DAILY

## 2019-09-19 ENCOUNTER — OFFICE VISIT (OUTPATIENT)
Dept: PRIMARY CARE CLINIC | Age: 39
End: 2019-09-19
Payer: COMMERCIAL

## 2019-09-19 VITALS
BODY MASS INDEX: 41.75 KG/M2 | SYSTOLIC BLOOD PRESSURE: 140 MMHG | DIASTOLIC BLOOD PRESSURE: 90 MMHG | HEART RATE: 88 BPM | WEIGHT: 315 LBS | HEIGHT: 73 IN | TEMPERATURE: 98.7 F | OXYGEN SATURATION: 98 %

## 2019-09-19 DIAGNOSIS — J01.00 ACUTE NON-RECURRENT MAXILLARY SINUSITIS: Primary | ICD-10-CM

## 2019-09-19 DIAGNOSIS — Z91.09 ENVIRONMENTAL ALLERGIES: ICD-10-CM

## 2019-09-19 DIAGNOSIS — K21.9 GASTROESOPHAGEAL REFLUX DISEASE, ESOPHAGITIS PRESENCE NOT SPECIFIED: ICD-10-CM

## 2019-09-19 PROCEDURE — 99213 OFFICE O/P EST LOW 20 MIN: CPT | Performed by: NURSE PRACTITIONER

## 2019-09-19 PROCEDURE — 96372 THER/PROPH/DIAG INJ SC/IM: CPT | Performed by: NURSE PRACTITIONER

## 2019-09-19 RX ORDER — PANTOPRAZOLE SODIUM 40 MG/1
40 TABLET, DELAYED RELEASE ORAL
Qty: 30 TABLET | Refills: 11 | Status: SHIPPED | OUTPATIENT
Start: 2019-09-19 | End: 2020-06-12 | Stop reason: SDUPTHER

## 2019-09-19 RX ORDER — FLUTICASONE PROPIONATE 50 MCG
2 SPRAY, SUSPENSION (ML) NASAL DAILY
Qty: 1 BOTTLE | Refills: 5 | Status: SHIPPED | OUTPATIENT
Start: 2019-09-19 | End: 2020-05-18

## 2019-09-19 RX ORDER — MONTELUKAST SODIUM 10 MG/1
10 TABLET ORAL DAILY
Qty: 30 TABLET | Refills: 11 | Status: SHIPPED | OUTPATIENT
Start: 2019-09-19 | End: 2020-09-18

## 2019-09-19 RX ORDER — DEXAMETHASONE SODIUM PHOSPHATE 4 MG/ML
10 INJECTION, SOLUTION INTRA-ARTICULAR; INTRALESIONAL; INTRAMUSCULAR; INTRAVENOUS; SOFT TISSUE ONCE
Status: COMPLETED | OUTPATIENT
Start: 2019-09-19 | End: 2019-09-19

## 2019-09-19 RX ORDER — AZITHROMYCIN 250 MG/1
250 TABLET, FILM COATED ORAL SEE ADMIN INSTRUCTIONS
Qty: 6 TABLET | Refills: 1 | Status: SHIPPED | OUTPATIENT
Start: 2019-09-19 | End: 2019-09-24

## 2019-09-19 RX ORDER — EPINASTINE HCL 0.05 %
1 DROPS OPHTHALMIC (EYE) EVERY 6 HOURS
Qty: 1 BOTTLE | Refills: 1 | Status: SHIPPED | OUTPATIENT
Start: 2019-09-19 | End: 2020-01-24

## 2019-09-19 RX ORDER — CETIRIZINE HYDROCHLORIDE 10 MG/1
10 TABLET ORAL DAILY
Qty: 30 TABLET | Refills: 11 | Status: SHIPPED | OUTPATIENT
Start: 2019-09-19 | End: 2020-09-18

## 2019-09-19 RX ADMIN — DEXAMETHASONE SODIUM PHOSPHATE 10 MG: 4 INJECTION, SOLUTION INTRA-ARTICULAR; INTRALESIONAL; INTRAMUSCULAR; INTRAVENOUS; SOFT TISSUE at 10:24

## 2019-09-19 ASSESSMENT — ENCOUNTER SYMPTOMS
DIARRHEA: 1
VOMITING: 0
CONSTIPATION: 0
ABDOMINAL PAIN: 0
EYE REDNESS: 0
SHORTNESS OF BREATH: 0
BACK PAIN: 0
NAUSEA: 0
SORE THROAT: 1
RHINORRHEA: 1
SINUS PRESSURE: 1
COUGH: 1

## 2019-09-19 NOTE — PATIENT INSTRUCTIONS
Patient Education        Sinusitis: Care Instructions  Your Care Instructions    Sinusitis is an infection of the lining of the sinus cavities in your head. Sinusitis often follows a cold. It causes pain and pressure in your head and face. In most cases, sinusitis gets better on its own in 1 to 2 weeks. But some mild symptoms may last for several weeks. Sometimes antibiotics are needed. Follow-up care is a key part of your treatment and safety. Be sure to make and go to all appointments, and call your doctor if you are having problems. It's also a good idea to know your test results and keep a list of the medicines you take. How can you care for yourself at home? · Take an over-the-counter pain medicine, such as acetaminophen (Tylenol), ibuprofen (Advil, Motrin), or naproxen (Aleve). Read and follow all instructions on the label. · If the doctor prescribed antibiotics, take them as directed. Do not stop taking them just because you feel better. You need to take the full course of antibiotics. · Be careful when taking over-the-counter cold or flu medicines and Tylenol at the same time. Many of these medicines have acetaminophen, which is Tylenol. Read the labels to make sure that you are not taking more than the recommended dose. Too much acetaminophen (Tylenol) can be harmful. · Breathe warm, moist air from a steamy shower, a hot bath, or a sink filled with hot water. Avoid cold, dry air. Using a humidifier in your home may help. Follow the directions for cleaning the machine. · Use saline (saltwater) nasal washes to help keep your nasal passages open and wash out mucus and bacteria. You can buy saline nose drops at a grocery store or drugstore. Or you can make your own at home by adding 1 teaspoon of salt and 1 teaspoon of baking soda to 2 cups of distilled water. If you make your own, fill a bulb syringe with the solution, insert the tip into your nostril, and squeeze gently. Louie Powellimoto your nose.   · Put a hot, wet towel or a warm gel pack on your face 3 or 4 times a day for 5 to 10 minutes each time. · Try a decongestant nasal spray like oxymetazoline (Afrin). Do not use it for more than 3 days in a row. Using it for more than 3 days can make your congestion worse. When should you call for help? Call your doctor now or seek immediate medical care if:    · You have new or worse swelling or redness in your face or around your eyes.     · You have a new or higher fever.    Watch closely for changes in your health, and be sure to contact your doctor if:    · You have new or worse facial pain.     · The mucus from your nose becomes thicker (like pus) or has new blood in it.     · You are not getting better as expected. Where can you learn more? Go to https://Drone.iopeCanal Interneteb.Pixer Technology. org and sign in to your Achieve X account. Enter C625 in the Actacell box to learn more about \"Sinusitis: Care Instructions. \"     If you do not have an account, please click on the \"Sign Up Now\" link. Current as of: October 21, 2018  Content Version: 12.1  © 6577-6031 Healthwise, Incorporated. Care instructions adapted under license by [facility] ([facility]). If you have questions about a medical condition or this instruction, always ask your healthcare professional. [address] any warranty or liability for your use of this information.

## 2019-09-19 NOTE — PROGRESS NOTES
[address]  Leonor Clark  Phone [phone]   Fax [phone]      OFFICE VISIT: 2019    Montana Delmi- : 1980    Chief Eduin Concepcion is a 45 y.o. male who is here for Sinus Problem and Congestion     HPI  The patient presents today for evaluation of cough and congestion. Reports sinus pressure, predominately in the maxillary area. Reports aural fullness and pressure  Reports sore throat. Reports cough. Productive. Denies SOA. Symptoms have worsening over the last few days. He has been using a Susan Pot. \"It has gotten worse and I can't sleep because my nose is all plugged up. \"     height is 6' 1\" (1.854 m) and weight is 354 lb (160.6 kg) (abnormal). His temporal temperature is 98.7 °F (37.1 °C). His blood pressure is 140/90 (abnormal) and his pulse is 88. His oxygen saturation is 98%. Body mass index is 46.7 kg/m².     Results for orders placed or performed in visit on 18   CBC Auto Differential   Result Value Ref Range    WBC 5.2 4.8 - 10.8 K/uL    RBC 4.99 4.70 - 6.10 M/uL    Hemoglobin 14.4 14.0 - 18.0 g/dL    Hematocrit 44.6 42.0 - 52.0 %    MCV 89.4 80.0 - 94.0 fL    MCH 28.9 27.0 - 31.0 pg    MCHC 32.3 (L) 33.0 - 37.0 g/dL    RDW 12.6 11.5 - 14.5 %    Platelets 974 955 - 628 K/uL    MPV 10.4 9.4 - 12.4 fL    Neutrophils % 63.7 50.0 - 65.0 %    Lymphocytes % 23.9 20.0 - 40.0 %    Monocytes % 7.6 0.0 - 10.0 %    Eosinophils % 3.8 0.0 - 5.0 %    Basophils % 0.6 0.0 - 1.0 %    Neutrophils Absolute 3.3 1.5 - 7.5 K/uL    Lymphocytes Absolute 1.3 1.1 - 4.5 K/uL    Monocytes Absolute 0.40 0.00 - 0.90 K/uL    Eosinophils Absolute 0.20 0.00 - 0.60 K/uL    Basophils Absolute 0.00 0.00 - 0.20 K/uL   Comprehensive Metabolic Panel   Result Value Ref Range    Sodium 141 136 - 145 mmol/L    Potassium 4.3 3.5 - 5.0 mmol/L    Chloride 103 98 - 111 mmol/L    CO2 23 22 - 29 mmol/L    Anion Gap 15 7 - 19 mmol/L    Glucose 89 74 - 109 mg/dL    BUN 10 6 - 20 mg/dL baking soda to 2 cups of distilled water. If you make your own, fill a bulb syringe with the solution, insert the tip into your nostril, and squeeze gently. Nellie Risen your nose. · Put a hot, wet towel or a warm gel pack on your face 3 or 4 times a day for 5 to 10 minutes each time. · Try a decongestant nasal spray like oxymetazoline (Afrin). Do not use it for more than 3 days in a row. Using it for more than 3 days can make your congestion worse. When should you call for help? Call your doctor now or seek immediate medical care if:    · You have new or worse swelling or redness in your face or around your eyes.     · You have a new or higher fever.    Watch closely for changes in your health, and be sure to contact your doctor if:    · You have new or worse facial pain.     · The mucus from your nose becomes thicker (like pus) or has new blood in it.     · You are not getting better as expected. Where can you learn more? Go to https://X2TV.Bookmytrainings.com. org and sign in to your Allovue account. Enter Y157 in the Silverback Systems box to learn more about \"Sinusitis: Care Instructions. \"     If you do not have an account, please click on the \"Sign Up Now\" link. Current as of: October 21, 2018  Content Version: 12.1  © 9165-6936 Healthwise, Incorporated. Care instructions adapted under license by [facility] ([facility]). If you have questions about a medical condition or this instruction, always ask your healthcare professional. [place] [address] any warranty or liability for your use of this information.                        Controlled Substances Monitoring:  n/a            Additional Instructions: As always, patient is advised to bring in medication bottles in order to correctly reconcile with our current list.    Bernabe Mays received counseling on the following healthy behaviors: n/a    Patient given educational materials on dx    I have instructed Bernabe Mays to complete a self tracking handout

## 2019-11-05 ENCOUNTER — OFFICE VISIT (OUTPATIENT)
Dept: PRIMARY CARE CLINIC | Age: 39
End: 2019-11-05
Payer: COMMERCIAL

## 2019-11-05 VITALS
HEIGHT: 73 IN | SYSTOLIC BLOOD PRESSURE: 132 MMHG | OXYGEN SATURATION: 98 % | TEMPERATURE: 97.6 F | HEART RATE: 91 BPM | WEIGHT: 315 LBS | BODY MASS INDEX: 41.75 KG/M2 | DIASTOLIC BLOOD PRESSURE: 88 MMHG

## 2019-11-05 DIAGNOSIS — G89.29 CHRONIC RIGHT SHOULDER PAIN: ICD-10-CM

## 2019-11-05 DIAGNOSIS — E66.01 CLASS 3 SEVERE OBESITY DUE TO EXCESS CALORIES WITH SERIOUS COMORBIDITY AND BODY MASS INDEX (BMI) OF 45.0 TO 49.9 IN ADULT (HCC): ICD-10-CM

## 2019-11-05 DIAGNOSIS — F51.01 PRIMARY INSOMNIA: ICD-10-CM

## 2019-11-05 DIAGNOSIS — M25.511 CHRONIC RIGHT SHOULDER PAIN: ICD-10-CM

## 2019-11-05 DIAGNOSIS — Z00.00 ENCOUNTER FOR PREVENTIVE HEALTH EXAMINATION: Primary | ICD-10-CM

## 2019-11-05 PROCEDURE — 99395 PREV VISIT EST AGE 18-39: CPT | Performed by: NURSE PRACTITIONER

## 2019-11-05 RX ORDER — HYDROXYZINE 50 MG/1
50 TABLET, FILM COATED ORAL NIGHTLY
Qty: 30 TABLET | Refills: 5 | Status: SHIPPED | OUTPATIENT
Start: 2019-11-05 | End: 2019-12-05

## 2019-11-05 ASSESSMENT — ENCOUNTER SYMPTOMS
DIARRHEA: 0
CONSTIPATION: 0
RHINORRHEA: 0
VOMITING: 0
SORE THROAT: 0
EYE REDNESS: 0
COUGH: 0
SHORTNESS OF BREATH: 0

## 2019-11-05 ASSESSMENT — PATIENT HEALTH QUESTIONNAIRE - PHQ9
2. FEELING DOWN, DEPRESSED OR HOPELESS: 0
SUM OF ALL RESPONSES TO PHQ QUESTIONS 1-9: 0
SUM OF ALL RESPONSES TO PHQ9 QUESTIONS 1 & 2: 0
1. LITTLE INTEREST OR PLEASURE IN DOING THINGS: 0
SUM OF ALL RESPONSES TO PHQ QUESTIONS 1-9: 0

## 2019-11-06 DIAGNOSIS — Z00.00 ENCOUNTER FOR PREVENTIVE HEALTH EXAMINATION: Primary | ICD-10-CM

## 2019-11-06 DIAGNOSIS — E66.01 CLASS 3 SEVERE OBESITY DUE TO EXCESS CALORIES WITH SERIOUS COMORBIDITY AND BODY MASS INDEX (BMI) OF 45.0 TO 49.9 IN ADULT (HCC): ICD-10-CM

## 2019-11-06 DIAGNOSIS — Z00.00 ENCOUNTER FOR PREVENTIVE HEALTH EXAMINATION: ICD-10-CM

## 2019-11-06 LAB
ALBUMIN SERPL-MCNC: 4.1 G/DL (ref 3.5–5.2)
ALP BLD-CCNC: 114 U/L (ref 40–130)
ALT SERPL-CCNC: 20 U/L (ref 5–41)
ANION GAP SERPL CALCULATED.3IONS-SCNC: 15 MMOL/L (ref 7–19)
AST SERPL-CCNC: 20 U/L (ref 5–40)
BASOPHILS ABSOLUTE: 0 K/UL (ref 0–0.2)
BASOPHILS RELATIVE PERCENT: 0.6 % (ref 0–1)
BILIRUB SERPL-MCNC: 0.5 MG/DL (ref 0.2–1.2)
BUN BLDV-MCNC: 11 MG/DL (ref 6–20)
CALCIUM SERPL-MCNC: 8.9 MG/DL (ref 8.6–10)
CHLORIDE BLD-SCNC: 102 MMOL/L (ref 98–111)
CHOLESTEROL, TOTAL: 181 MG/DL (ref 160–199)
CO2: 24 MMOL/L (ref 22–29)
CREAT SERPL-MCNC: 0.7 MG/DL (ref 0.5–1.2)
CREATININE URINE: 292.7 MG/DL (ref 4.2–622)
EOSINOPHILS ABSOLUTE: 0.2 K/UL (ref 0–0.6)
EOSINOPHILS RELATIVE PERCENT: 4.5 % (ref 0–5)
GFR NON-AFRICAN AMERICAN: >60
GLUCOSE BLD-MCNC: 51 MG/DL (ref 74–109)
HCT VFR BLD CALC: 44.6 % (ref 42–52)
HDLC SERPL-MCNC: 54 MG/DL (ref 55–121)
HEMOGLOBIN: 14.3 G/DL (ref 14–18)
IMMATURE GRANULOCYTES #: 0 K/UL
LDL CHOLESTEROL CALCULATED: 101 MG/DL
LYMPHOCYTES ABSOLUTE: 1.2 K/UL (ref 1.1–4.5)
LYMPHOCYTES RELATIVE PERCENT: 24.8 % (ref 20–40)
MCH RBC QN AUTO: 28.9 PG (ref 27–31)
MCHC RBC AUTO-ENTMCNC: 32.1 G/DL (ref 33–37)
MCV RBC AUTO: 90.3 FL (ref 80–94)
MICROALBUMIN UR-MCNC: <1.2 MG/DL (ref 0–19)
MICROALBUMIN/CREAT UR-RTO: NORMAL MG/G
MONOCYTES ABSOLUTE: 0.4 K/UL (ref 0–0.9)
MONOCYTES RELATIVE PERCENT: 7.9 % (ref 0–10)
NEUTROPHILS ABSOLUTE: 2.9 K/UL (ref 1.5–7.5)
NEUTROPHILS RELATIVE PERCENT: 61.8 % (ref 50–65)
PDW BLD-RTO: 13 % (ref 11.5–14.5)
PLATELET # BLD: 229 K/UL (ref 130–400)
PMV BLD AUTO: 10.4 FL (ref 9.4–12.4)
POTASSIUM SERPL-SCNC: 4.1 MMOL/L (ref 3.5–5)
RBC # BLD: 4.94 M/UL (ref 4.7–6.1)
SODIUM BLD-SCNC: 141 MMOL/L (ref 136–145)
T4 FREE: 1.1 NG/DL (ref 0.9–1.7)
TOTAL PROTEIN: 7.2 G/DL (ref 6.6–8.7)
TRIGL SERPL-MCNC: 130 MG/DL (ref 0–149)
TSH SERPL DL<=0.05 MIU/L-ACNC: 1.59 UIU/ML (ref 0.27–4.2)
WBC # BLD: 4.7 K/UL (ref 4.8–10.8)

## 2019-11-07 ENCOUNTER — TELEPHONE (OUTPATIENT)
Dept: PRIMARY CARE CLINIC | Age: 39
End: 2019-11-07

## 2019-12-17 ENCOUNTER — OFFICE VISIT (OUTPATIENT)
Dept: PRIMARY CARE CLINIC | Age: 39
End: 2019-12-17
Payer: COMMERCIAL

## 2019-12-17 VITALS
BODY MASS INDEX: 41.75 KG/M2 | TEMPERATURE: 97.1 F | HEART RATE: 76 BPM | DIASTOLIC BLOOD PRESSURE: 84 MMHG | HEIGHT: 73 IN | SYSTOLIC BLOOD PRESSURE: 136 MMHG | WEIGHT: 315 LBS | OXYGEN SATURATION: 96 %

## 2019-12-17 DIAGNOSIS — E66.01 CLASS 3 SEVERE OBESITY DUE TO EXCESS CALORIES WITHOUT SERIOUS COMORBIDITY WITH BODY MASS INDEX (BMI) OF 45.0 TO 49.9 IN ADULT (HCC): Primary | ICD-10-CM

## 2019-12-17 PROCEDURE — 99213 OFFICE O/P EST LOW 20 MIN: CPT | Performed by: NURSE PRACTITIONER

## 2019-12-17 RX ORDER — LIRAGLUTIDE 6 MG/ML
1.2 INJECTION, SOLUTION SUBCUTANEOUS DAILY
Qty: 2 PEN | Refills: 3 | Status: SHIPPED | OUTPATIENT
Start: 2019-12-17 | End: 2020-01-16

## 2019-12-17 RX ORDER — LIRAGLUTIDE 6 MG/ML
INJECTION, SOLUTION SUBCUTANEOUS
Refills: 5 | COMMUNITY
Start: 2019-12-06 | End: 2019-12-17 | Stop reason: SDUPTHER

## 2019-12-17 RX ORDER — HYDROXYZINE 50 MG/1
TABLET, FILM COATED ORAL
Refills: 5 | COMMUNITY
Start: 2019-12-06 | End: 2020-04-21 | Stop reason: SDUPTHER

## 2019-12-17 RX ORDER — PEN NEEDLE, DIABETIC 31 GX5/16"
NEEDLE, DISPOSABLE MISCELLANEOUS
Refills: 1 | COMMUNITY
Start: 2019-12-06 | End: 2020-09-14

## 2019-12-17 ASSESSMENT — ENCOUNTER SYMPTOMS
SHORTNESS OF BREATH: 0
SORE THROAT: 0
EYE REDNESS: 0
RHINORRHEA: 0
CONSTIPATION: 0
DIARRHEA: 0
COUGH: 0
VOMITING: 0

## 2019-12-23 DIAGNOSIS — E66.01 CLASS 3 SEVERE OBESITY DUE TO EXCESS CALORIES WITHOUT SERIOUS COMORBIDITY WITH BODY MASS INDEX (BMI) OF 45.0 TO 49.9 IN ADULT (HCC): ICD-10-CM

## 2020-01-16 NOTE — TELEPHONE ENCOUNTER
Pt calls and says that the spot on his finger is back and request refill. Requested Prescriptions     Pending Prescriptions Disp Refills    mupirocin (BACTROBAN) 2 % ointment 1 g 1     Sig: Apply 3 times daily.

## 2020-01-24 RX ORDER — EPINASTINE HCL 0.05 %
DROPS OPHTHALMIC (EYE)
Qty: 5 ML | Refills: 1 | Status: SHIPPED | OUTPATIENT
Start: 2020-01-24 | End: 2020-10-13

## 2020-02-24 ENCOUNTER — TELEPHONE (OUTPATIENT)
Dept: PRIMARY CARE CLINIC | Age: 40
End: 2020-02-24

## 2020-04-21 ENCOUNTER — OFFICE VISIT (OUTPATIENT)
Dept: PRIMARY CARE CLINIC | Age: 40
End: 2020-04-21
Payer: COMMERCIAL

## 2020-04-21 VITALS
BODY MASS INDEX: 41.75 KG/M2 | HEART RATE: 85 BPM | DIASTOLIC BLOOD PRESSURE: 88 MMHG | WEIGHT: 315 LBS | TEMPERATURE: 96.8 F | OXYGEN SATURATION: 98 % | HEIGHT: 73 IN | SYSTOLIC BLOOD PRESSURE: 130 MMHG

## 2020-04-21 PROCEDURE — 96372 THER/PROPH/DIAG INJ SC/IM: CPT | Performed by: NURSE PRACTITIONER

## 2020-04-21 PROCEDURE — 99213 OFFICE O/P EST LOW 20 MIN: CPT | Performed by: NURSE PRACTITIONER

## 2020-04-21 RX ORDER — NYSTATIN 100000 [USP'U]/G
POWDER TOPICAL 3 TIMES DAILY
Qty: 1 BOTTLE | Refills: 3 | Status: SHIPPED | OUTPATIENT
Start: 2020-04-21

## 2020-04-21 RX ORDER — DEXAMETHASONE SODIUM PHOSPHATE 4 MG/ML
10 INJECTION, SOLUTION INTRA-ARTICULAR; INTRALESIONAL; INTRAMUSCULAR; INTRAVENOUS; SOFT TISSUE ONCE
Status: COMPLETED | OUTPATIENT
Start: 2020-04-21 | End: 2020-04-21

## 2020-04-21 RX ORDER — LIRAGLUTIDE 6 MG/ML
INJECTION, SOLUTION SUBCUTANEOUS
COMMUNITY
Start: 2020-04-01 | End: 2020-05-18

## 2020-04-21 RX ORDER — HYDROXYZINE 50 MG/1
50 TABLET, FILM COATED ORAL EVERY 6 HOURS PRN
Qty: 60 TABLET | Refills: 5 | Status: SHIPPED | OUTPATIENT
Start: 2020-04-21 | End: 2020-10-13 | Stop reason: SDUPTHER

## 2020-04-21 RX ADMIN — DEXAMETHASONE SODIUM PHOSPHATE 10 MG: 4 INJECTION, SOLUTION INTRA-ARTICULAR; INTRALESIONAL; INTRAMUSCULAR; INTRAVENOUS; SOFT TISSUE at 09:14

## 2020-04-21 ASSESSMENT — ENCOUNTER SYMPTOMS
COUGH: 0
SHORTNESS OF BREATH: 0
RHINORRHEA: 1

## 2020-04-21 NOTE — PROGRESS NOTES
Jaylon Carlin Kennedy  Phone [phone]   Fax [phone]      OFFICE VISIT: 2020    Susy Ureña- : 1980    [facility] is a 44 y.o. male who is here for Skin Problem and Medication Refill     HPI  The patient presents today for evaluation of a rash. It is on the left hand 5th digit, right hand pointer finger and right upper arm. Reports he gets a similar rash every year. \"It is allergy related. \"  He continues on Zyrtec and Singulair. \"Just typical seasonal allergies. \"  \"I get evil eyed at [employer] when I sneeze. \"    \"My temperament has gotten worse. \"  \"When people do stupid stuff, I call them out on it. \"  He defers any medication. height is 6' 1\" (1.854 m) and weight is 356 lb (161.5 kg) (abnormal). His temporal temperature is 96.8 °F (36 °C). His blood pressure is 130/88 and his pulse is 85. His oxygen saturation is 98%. Body mass index is 46.97 kg/m².     Results for orders placed or performed in visit on 19   TSH without Reflex   Result Value Ref Range    TSH 1.590 0.270 - 4.200 uIU/mL   T4, Free   Result Value Ref Range    T4 Free 1.1 0.9 - 1.7 ng/dL   Microalbumin / Creatinine Urine Ratio   Result Value Ref Range    Microalbumin, Random Urine <1.20 0.00 - 19.00 mg/dL    Creatinine, Ur 292.7 4.2 - 622.0 mg/dL    Microalbumin Creatinine Ratio see below mg/g   Lipid Panel   Result Value Ref Range    Cholesterol, Total 181 160 - 199 mg/dL    Triglycerides 130 0 - 149 mg/dL    HDL 54 (L) 55 - 121 mg/dL    LDL Calculated 101 <100 mg/dL   Comprehensive Metabolic Panel   Result Value Ref Range    Sodium 141 136 - 145 mmol/L    Potassium 4.1 3.5 - 5.0 mmol/L    Chloride 102 98 - 111 mmol/L    CO2 24 22 - 29 mmol/L    Anion Gap 15 7 - 19 mmol/L    Glucose 51 (L) 74 - 109 mg/dL    BUN 11 6 - 20 mg/dL    CREATININE 0.7 0.5 - 1.2 mg/dL    GFR Non-African American >60 >60    Calcium 8.9 8.6 - 10.0 mg/dL    Total Protein 7.2 6.6 - 8.7 g/dL    Alb 4.1 3.5 - triamcinolone and apply topically to rash twice daily. dexamethasone (DECADRON) injection 10 mg   2. Yeast infection B37.9 nystatin (MYCOSTATIN) 937335 UNIT/GM powder   3. Environmental allergies Z91.09 hydrOXYzine (ATARAX) 50 MG tablet         PLAN    No orders of the defined types were placed in this encounter. Return if symptoms worsen or fail to improve. Patient Instructions       Patient Education        Rash: Care Instructions  Your Care Instructions  A rash is any irritation or inflammation of the skin. Rashes have many possible causes, including allergy, infection, illness, heat, and emotional stress. Follow-up care is a key part of your treatment and safety. Be sure to make and go to all appointments, and call your doctor if you are having problems. It's also a good idea to know your test results and keep a list of the medicines you take. How can you care for yourself at home? · Wash the area with water only. Soap can make dryness and itching worse. Pat dry. · Put cold, wet cloths on the rash to reduce itching. · Keep cool, and stay out of the sun. · Leave the rash open to the air as much of the time as possible. · Sometimes petroleum jelly (Vaseline) can help relieve the discomfort caused by a rash. A moisturizing lotion, such as Cetaphil, also may help. Calamine lotion may help for rashes caused by contact with something (such as a plant or soap) that irritated the skin. Use it 3 or 4 times a day. · If your doctor prescribed a cream, use it as directed. If your doctor prescribed medicine, take it exactly as directed. · If your rash itches so badly that it interferes with your normal activities, take an over-the-counter antihistamine, such as diphenhydramine (Benadryl) or loratadine (Claritin). Read and follow all instructions on the label. When should you call for help?   Call your doctor now or seek immediate medical care if:    · You have signs of infection, such as:  ? Increased pain, swelling, warmth, or redness. ? Red streaks leading from the area. ? Pus draining from the area. ? A fever.     · You have joint pain along with the rash.    Watch closely for changes in your health, and be sure to contact your doctor if:    · Your rash is changing or getting worse. For example, call if you have pain along with the rash, the rash is spreading, or you have new blisters.     · You do not get better after 1 week. Where can you learn more? Go to https://Linko [email or web address].Fanhuan.com. org and sign in to your ShoutNow account. Enter C901 in the [employer] box to learn more about \"Rash: Care Instructions. \"     If you do not have an account, please click on the \"Sign Up Now\" link. Current as of: October 30, 2019Content Version: 12.4  © 2085-6481 [employer]. Care instructions adapted under license by [facility] ([facility]). If you have questions about a medical condition or this instruction, always ask your healthcare professional. [place] [address] any warranty or liability for your use of this information. Controlled Substances Monitoring:  n/a            Additional Instructions: As always, patient is advised to bring in medication bottles in order to correctly reconcile with our current list.    Jenni Kwon received counseling on the following healthy behaviors: n/a    Patient given educational materials on dx    I have instructed Jenni Kwon to complete a self tracking handout on n/a and instructed them to bring it with them to his next appointment. Discussed use, benefit, and side effects of prescribed medications. Barriers to medication compliance addressed. All patient questions answered. Pt voiced understanding.      LASHONDA Li

## 2020-04-21 NOTE — PATIENT INSTRUCTIONS
· You do not get better after 1 week. Where can you learn more? Go to [email or web address] and sign in to your [employer] account. Enter Y034 in the [employer] box to learn more about \"Rash: Care Instructions. \"     If you do not have an account, please click on the \"Sign Up Now\" link. Current as of: October 30, 2019Content Version: 12.4  © 4162-8321 Healthwise, Incorporated. Care instructions adapted under license by [facility] ([facility]). If you have questions about a medical condition or this instruction, always ask your healthcare professional. [address] any warranty or liability for your use of this information.

## 2020-04-21 NOTE — PROGRESS NOTES
After obtaining consent, and per orders of CAMILA GALLEGO, injection of 10mg decadron given in Right upper quad. gluteus  by Calli Maldonado. Patient tolerated well. Medication was not supplied by patient.

## 2020-05-18 RX ORDER — LIRAGLUTIDE 6 MG/ML
INJECTION, SOLUTION SUBCUTANEOUS
Qty: 6 ML | Refills: 5 | Status: SHIPPED | OUTPATIENT
Start: 2020-05-18 | End: 2020-09-18

## 2020-05-18 RX ORDER — FLUTICASONE PROPIONATE 50 MCG
SPRAY, SUSPENSION (ML) NASAL
Qty: 16 G | Refills: 5 | Status: SHIPPED | OUTPATIENT
Start: 2020-05-18 | End: 2021-03-24

## 2020-05-18 NOTE — TELEPHONE ENCOUNTER
Received fax from pharmacy requesting refill on pts medication(s). Pt was last seen in office on 4/21/2020  and has a follow up scheduled for Visit date not found. Will send request to  [facility]  for authorization.      Requested Prescriptions     Pending Prescriptions Disp Refills    fluticasone (FLONASE) 50 MCG/ACT nasal spray [Pharmacy Med Name: FLUTICASONE PROP 50 MCG SPR 50 VITALIY] 16 g 5     Sig: INSTILL 2 SPRAYS IN EACH NOSTRIL DAILY

## 2020-05-22 ENCOUNTER — TELEPHONE (OUTPATIENT)
Dept: PRIMARY CARE CLINIC | Age: 40
End: 2020-05-22

## 2020-06-12 ENCOUNTER — VIRTUAL VISIT (OUTPATIENT)
Dept: PRIMARY CARE CLINIC | Age: 40
End: 2020-06-12
Payer: COMMERCIAL

## 2020-06-12 PROCEDURE — 99214 OFFICE O/P EST MOD 30 MIN: CPT | Performed by: NURSE PRACTITIONER

## 2020-06-12 RX ORDER — PANTOPRAZOLE SODIUM 40 MG/1
40 TABLET, DELAYED RELEASE ORAL 2 TIMES DAILY
Qty: 60 TABLET | Refills: 11 | Status: SHIPPED | OUTPATIENT
Start: 2020-06-12 | End: 2021-05-20

## 2020-06-12 ASSESSMENT — ENCOUNTER SYMPTOMS
COUGH: 0
RHINORRHEA: 1
SHORTNESS OF BREATH: 0

## 2020-06-12 NOTE — PROGRESS NOTES
11/06/2019 51*    BUN 11/06/2019 11     CREATININE 11/06/2019 0.7     GFR Non- 11/06/2019 >60     Calcium 11/06/2019 8.9     Total Protein 11/06/2019 7.2     Alb 11/06/2019 4.1     Total Bilirubin 11/06/2019 0.5     Alkaline Phosphatase 11/06/2019 114     ALT 11/06/2019 20     AST 11/06/2019 20     WBC 11/06/2019 4.7*    RBC 11/06/2019 4.94     Hemoglobin 11/06/2019 14.3     Hematocrit 11/06/2019 44.6     MCV 11/06/2019 90.3     MCH 11/06/2019 28.9     MCHC 11/06/2019 32.1*    RDW 11/06/2019 13.0     Platelets 12/21/6217 229     MPV 11/06/2019 10.4     Neutrophils % 11/06/2019 61.8     Lymphocytes % 11/06/2019 24.8     Monocytes % 11/06/2019 7.9     Eosinophils % 11/06/2019 4.5     Basophils % 11/06/2019 0.6     Neutrophils Absolute 11/06/2019 2.9     Immature Granulocytes # 11/06/2019 0.0     Lymphocytes Absolute 11/06/2019 1.2     Monocytes Absolute 11/06/2019 0.40     Eosinophils Absolute 11/06/2019 0.20     Basophils Absolute 11/06/2019 0.00      Copies of these are in the chart. Prior to Visit Medications    Medication Sig Taking? Authorizing Provider   pantoprazole (PROTONIX) 40 MG tablet Take 1 tablet by mouth 2 times daily Yes LASHONDA Arrieta   fluticasone (FLONASE) 50 MCG/ACT nasal spray INSTILL 2 SPRAYS IN EACH NOSTRIL DAILY  LASHONDA Kim   SAXENDA 18 MG/3ML SOPN INJECT 1.2 MG INTO THE SKIN DAILY. LASHONDA Kim   hydrOXYzine (ATARAX) 50 MG tablet Take 1 tablet by mouth every 6 hours as needed for Itching  LASHONDA Arrieta   nystatin (MYCOSTATIN) 770302 UNIT/GM powder Apply topically 3 times daily Apply 3 times daily. LASHONDA Arrieta   mupirocin (BACTROBAN) 2 % ointment APPLY ONE APPLICATION THREE TIMES DAILY.   LASHONDA Arrieta   epinastine (ELESTAT) 0.05 % SOLN PLACE ONE DROP IN BOTH EYES EVERY 6 HOURS  LASHONDA Arrieta   B-D ULTRAFINE III SHORT PEN 31G X 8 MM [facility]   Historical Provider, MD   cetirizine pantoprazole (PROTONIX) 40 MG tablet (increased to BID)  All foods cause the stomach to produce acid, although foods can affect people in different ways. Following is a list of foods and beverages that may aggravate your stomach. You may want to eat them less often. 1. Fried foods or fatty foods  2. Heavy seasoning and spicy foods  3. Coffee  4. Onions  5. Orange juice, grapefruit juice, and tomato juice  6. Alcoholic beverages  7. Chocolate  8. Peppermint     Try these lifestyle changes:    1. Stop smoking  2. Exercise to help control your weight  3. Eat small well-balanced meals  4. Reduce abdominal pressure (ie) tight belts  5. Avoid eating within 2 hours of bedtime  6. Elevate the head of the bed 6 to 8 inches higher than the foot of the bed. 2. Snoring R06.83 Sleep Study with PAP Titration   3. Chronic fatigue R53.82 Sleep Study with PAP Titration   4. Morning headache R51 Sleep Study with PAP Titration   5. Class 3 severe obesity due to excess calories with serious comorbidity and body mass index (BMI) of 45.0 to 49.9 in [facility]) E66.01 Sleep Study with PAP Titration    Z68.42    6. Environmental allergies Z91.09 Continue Singulair 10 mg, Zyrtec 10 mg and Flonase         PLAN    Orders Placed This Encounter   Procedures    Sleep Study with PAP Titration        Return in about 6 weeks (around 7/24/2020), or if symptoms worsen or fail to improve. There are no Patient Instructions on file for this visit. Controlled Substances Monitoring:  n/a            Kash Sen is a 44 y.o. male being evaluated by a Virtual Visit (video visit) encounter to address concerns as mentioned above. A caregiver was present when appropriate. Due to this being a TeleHealth encounter (During UAYDX-91 public health emergency), evaluation of the following organ systems was limited: Vitals/Constitutional/EENT/Resp/CV/GI//MS/Neuro/Skin/Heme-Lymph-Imm.   Pursuant to the emergency declaration under the

## 2020-06-15 ENCOUNTER — TELEPHONE (OUTPATIENT)
Dept: PRIMARY CARE CLINIC | Age: 40
End: 2020-06-15

## 2020-06-15 NOTE — TELEPHONE ENCOUNTER
Pt called to see about getting his prednisone refilled due to his allergies. Cinthia Oro he talked to you about this last week.

## 2020-06-16 RX ORDER — PREDNISONE 10 MG/1
10 TABLET ORAL DAILY
Qty: 42 TABLET | Refills: 0 | Status: SHIPPED | OUTPATIENT
Start: 2020-06-16 | End: 2020-06-26

## 2020-07-20 ENCOUNTER — HOSPITAL ENCOUNTER (OUTPATIENT)
Dept: SLEEP CENTER | Age: 40
Discharge: HOME | End: 2020-07-22
Payer: COMMERCIAL

## 2020-07-20 PROCEDURE — 95806 SLEEP STUDY UNATT&RESP EFFT: CPT | Performed by: PSYCHIATRY & NEUROLOGY

## 2020-07-20 PROCEDURE — G0399 HOME SLEEP TEST/TYPE 3 PORTA: HCPCS

## 2020-07-21 PROCEDURE — G0399 HOME SLEEP TEST/TYPE 3 PORTA: HCPCS

## 2020-07-21 PROCEDURE — 95806 SLEEP STUDY UNATT&RESP EFFT: CPT | Performed by: PSYCHIATRY & NEUROLOGY

## 2020-07-24 ENCOUNTER — VIRTUAL VISIT (OUTPATIENT)
Dept: PRIMARY CARE CLINIC | Age: 40
End: 2020-07-24
Payer: COMMERCIAL

## 2020-07-24 PROCEDURE — 99442 PR PHYS/QHP TELEPHONE EVALUATION 11-20 MIN: CPT | Performed by: NURSE PRACTITIONER

## 2020-07-24 ASSESSMENT — ENCOUNTER SYMPTOMS
RHINORRHEA: 1
SHORTNESS OF BREATH: 0
COUGH: 0

## 2020-07-24 NOTE — PROGRESS NOTES
[address]  Phone [phone]   Fax  PHONE CALL VISIT: 2020    Pratibha Trejo- : 1980    Chief Jazz Isaacs is a 44 y.o. male who is here for Gastroesophageal Reflux     HPI  The patient is called a tele-health phone call, he could not connect to doxy. me. SLEEP STUDY:  \"I had my sleep study at home. \"  He continues to snore nightly. Reports chronic fatigue. Awaiting read on sleep study. GERD:  Denies any further chest pressure. He has been taking Protonix 40 mg BID. BELEM has been well controlled. ALLERGIES:  \"I got my typical allergies. \"  He has been taking Flonase, Zyrtec and Singulair. Denies any sinus pressure     vitals were not taken for this visit. There is no height or weight on file to calculate BMI.     Results for orders placed or performed in visit on 19   TSH without Reflex   Result Value Ref Range    TSH 1.590 0.270 - 4.200 uIU/mL   T4, Free   Result Value Ref Range    T4 Free 1.1 0.9 - 1.7 ng/dL   Microalbumin / Creatinine Urine Ratio   Result Value Ref Range    Microalbumin, Random Urine <1.20 0.00 - 19.00 mg/dL    Creatinine, Ur 292.7 4.2 - 622.0 mg/dL    Microalbumin Creatinine Ratio see below mg/g   Lipid Panel   Result Value Ref Range    Cholesterol, Total 181 160 - 199 mg/dL    Triglycerides 130 0 - 149 mg/dL    HDL 54 (L) 55 - 121 mg/dL    LDL Calculated 101 <100 mg/dL   Comprehensive Metabolic Panel   Result Value Ref Range    Sodium 141 136 - 145 mmol/L    Potassium 4.1 3.5 - 5.0 mmol/L    Chloride 102 98 - 111 mmol/L    CO2 24 22 - 29 mmol/L    Anion Gap 15 7 - 19 mmol/L    Glucose 51 (L) 74 - 109 mg/dL    BUN 11 6 - 20 mg/dL    CREATININE 0.7 0.5 - 1.2 mg/dL    GFR Non-African American >60 >60    Calcium 8.9 8.6 - 10.0 mg/dL    Total Protein 7.2 6.6 - 8.7 g/dL    Alb 4.1 3.5 - 5.2 g/dL    Total Bilirubin 0.5 0.2 - 1.2 mg/dL    Alkaline Phosphatase 114 40 - 130 U/L    ALT 20 5 - 41 U/L    AST 20 5 28.9     [facility] 11/06/2019 32.1*    RDW 11/06/2019 13.0     Platelets 25/97/5676 229     MPV 11/06/2019 10.4     Neutrophils % 11/06/2019 61.8     Lymphocytes % 11/06/2019 24.8     Monocytes % 11/06/2019 7.9     Eosinophils % 11/06/2019 4.5     Basophils % 11/06/2019 0.6     Neutrophils Absolute 11/06/2019 2.9     Immature Granulocytes # 11/06/2019 0.0     Lymphocytes Absolute 11/06/2019 1.2     Monocytes Absolute 11/06/2019 0.40     Eosinophils Absolute 11/06/2019 0.20     Basophils Absolute 11/06/2019 0.00      Copies of these are in the chart. Prior to Visit Medications    Medication Sig Taking? Authorizing Provider   mupirocin (BACTROBAN) 2 % ointment APPLY ONE APPLICATION THREE TIMES DAILY. Yes LASHONDA Arrieta   pantoprazole (PROTONIX) 40 MG tablet Take 1 tablet by mouth 2 times daily  LASHONDA Arrieta   fluticasone (FLONASE) 50 MCG/ACT nasal spray INSTILL 2 SPRAYS IN EACH NOSTRIL DAILY  Lyn Mcardle Fiessinger, APRN   SAXENDA 18 MG/3ML SOPN INJECT 1.2 MG INTO THE SKIN DAILY. Lyn Mcardle Fiessinger, APRN   hydrOXYzine (ATARAX) 50 MG tablet Take 1 tablet by mouth every 6 hours as needed for Itching  LASHONDA Arrieta   nystatin (MYCOSTATIN) 922199 UNIT/GM powder Apply topically 3 times daily Apply 3 times daily. LASHONDA Arrieta   epinastine (ELESTAT) 0.05 % SOLN PLACE ONE DROP IN BOTH EYES EVERY 6 HOURS  LASHONDA Arrieta   B-D ULTRAFINE III SHORT PEN 31G X 8 MM [facility]   Historical Provider, MD   cetirizine (ZYRTEC) 10 MG tablet Take 1 tablet by mouth daily  LASHONDA Arrieta   montelukast (SINGULAIR) 10 MG tablet Take 1 tablet by mouth daily  LASHONDA Arrieta   EPINEPHrine (EPIPEN 2-KEIKO) 0.3 MG/0.3ML SOAJ injection Inject 0.3 mLs into the skin once for 1 dose Use as directed for allergic reaction  LASHONDA Arrieta       Allergies: Cat hair extract; Dog epithelium;  Penicillins; and Meat extract    Past Medical History:   Diagnosis Date    Hypertension        Past Surgical History:   Procedure Laterality Date    GASTRIC BYPASS SURGERY         Social History     Tobacco Use    Smoking status: Never Smoker    Smokeless tobacco: Never Used   Substance Use Topics    Alcohol use: Yes     Comment: rarely       No family history on file. Review of Systems   Constitutional: Positive for fatigue. Negative for fever. HENT: Positive for congestion, postnasal drip and rhinorrhea. Snoring   Respiratory: Negative for cough and shortness of breath. Cardiovascular: Negative for chest pain. Gastrointestinal:        BELEM controlled with BID Protonix       Physical Exam  N/A DUE TO TELE-HEALTH    ASSESSMENT      ICD-10-CM    1. Environmental allergies  Z91.09 Continue Flonase, Singulair and Zyrtec   2. Snoring  R06.83 Awaiting sleep study   3. Chronic fatigue  R53.82 Awaiting sleep study   4. Gastroesophageal reflux disease, esophagitis presence not specified  K21.9 Continue Protonix 40 mg BID         PLAN    No orders of the defined types were placed in this encounter. Return in about 6 months (around 1/24/2021), or if symptoms worsen or fail to improve. There are no Patient Instructions on file for this visit. Controlled Substances Monitoring:  n/a            Feliz Braun is a 44 y.o. male being evaluated by a Virtual Visit (video visit) encounter to address concerns as mentioned above. A caregiver was present when appropriate. Due to this being a TeleHealth encounter (During CRTRR-89 public health emergency), evaluation of the following organ systems was limited: Vitals/Constitutional/EENT/Resp/CV/GI//MS/Neuro/Skin/Heme-Lymph-Imm.   Pursuant to the emergency declaration under the [address] [place] [address], [address] [place] [address] authority and the [employer] and [employer] Act, this Virtual Visit was conducted with patient's (and/or legal guardian's) consent, to reduce the patient's risk of exposure to COVID-19 and provide necessary medical care. The patient (and/or legal guardian) has also been advised to contact this office for worsening conditions or problems, and seek emergency medical treatment and/or call 911 if deemed necessary. Patient identification was verified at the start of the visit: Yes    Total time spent for this encounter: 16 minutes    Services were provided through a video synchronous discussion virtually to substitute for in-person clinic visit. Patient and provider were located at their individual homes. --LASHONDA Sahu on 7/24/2020 at 8:22 AM    An electronic signature was used to authenticate this note.

## 2020-08-18 ENCOUNTER — OFFICE VISIT (OUTPATIENT)
Dept: PRIMARY CARE CLINIC | Age: 40
End: 2020-08-18
Payer: COMMERCIAL

## 2020-08-18 VITALS
SYSTOLIC BLOOD PRESSURE: 130 MMHG | HEART RATE: 100 BPM | BODY MASS INDEX: 41.75 KG/M2 | OXYGEN SATURATION: 98 % | TEMPERATURE: 97 F | HEIGHT: 73 IN | DIASTOLIC BLOOD PRESSURE: 80 MMHG | WEIGHT: 315 LBS

## 2020-08-18 PROCEDURE — 99213 OFFICE O/P EST LOW 20 MIN: CPT | Performed by: NURSE PRACTITIONER

## 2020-08-18 RX ORDER — DOXYCYCLINE HYCLATE 100 MG/1
100 CAPSULE ORAL 2 TIMES DAILY
Qty: 20 CAPSULE | Refills: 0 | Status: SHIPPED | OUTPATIENT
Start: 2020-08-18 | End: 2020-08-28

## 2020-08-18 RX ORDER — METHYLPREDNISOLONE 4 MG/1
TABLET ORAL
Qty: 1 KIT | Refills: 0 | Status: SHIPPED | OUTPATIENT
Start: 2020-08-18 | End: 2020-08-24

## 2020-08-18 ASSESSMENT — ENCOUNTER SYMPTOMS
SHORTNESS OF BREATH: 0
ABDOMINAL PAIN: 0
RHINORRHEA: 1
COUGH: 0

## 2020-08-18 ASSESSMENT — PATIENT HEALTH QUESTIONNAIRE - PHQ9
2. FEELING DOWN, DEPRESSED OR HOPELESS: 0
SUM OF ALL RESPONSES TO PHQ QUESTIONS 1-9: 0
SUM OF ALL RESPONSES TO PHQ9 QUESTIONS 1 & 2: 0
SUM OF ALL RESPONSES TO PHQ QUESTIONS 1-9: 0
1. LITTLE INTEREST OR PLEASURE IN DOING THINGS: 0

## 2020-08-18 NOTE — PATIENT INSTRUCTIONS
Patient Education        Cellulitis: Care Instructions  Your Care Instructions     Cellulitis is a skin infection caused by bacteria, most often strep or staph. It often occurs after a break in the skin from a scrape, cut, bite, or puncture, or after a rash. Cellulitis may be treated without doing tests to find out what caused it. But your doctor may do tests, if needed, to look for a specific bacteria, like methicillin-resistant Staphylococcus aureus (MRSA). The doctor has checked you carefully, but problems can develop later. If you notice any problems or new symptoms, get medical treatment right away. Follow-up care is a key part of your treatment and safety. Be sure to make and go to all appointments, and call your doctor if you are having problems. It's also a good idea to know your test results and keep a list of the medicines you take. How can you care for yourself at home? · Take your antibiotics as directed. Do not stop taking them just because you feel better. You need to take the full course of antibiotics. · Prop up the infected area on pillows to reduce pain and swelling. Try to keep the area above the level of your heart as often as you can. · If your doctor told you how to care for your wound, follow your doctor's instructions. If you did not get instructions, follow this general advice:  ? Wash the wound with clean water 2 times a day. Don't use hydrogen peroxide or alcohol, which can slow healing. ? You may cover the wound with a thin layer of petroleum jelly, such as Vaseline, and a nonstick bandage. ? Apply more petroleum jelly and replace the bandage as needed. · Be safe with medicines. Take pain medicines exactly as directed. ? If the doctor gave you a prescription medicine for pain, take it as prescribed. ? If you are not taking a prescription pain medicine, ask your doctor if you can take an over-the-counter medicine.   To prevent cellulitis in the future  · Try to prevent cuts, scrapes, or other injuries to your skin. Cellulitis most often occurs where there is a break in the skin. · If you get a scrape, cut, mild burn, or bite, wash the wound with clean water as soon as you can to help avoid infection. Don't use hydrogen peroxide or alcohol, which can slow healing. · If you have swelling in your legs (edema), support stockings and good skin care may help prevent leg sores and cellulitis. · Take care of your feet, especially if you have diabetes or other conditions that increase the risk of infection. Wear shoes and socks. Do not go barefoot. If you have athlete's foot or other skin problems on your feet, talk to your doctor about how to treat them. When should you call for help? Call your doctor now or seek immediate medical care if:  · You have signs that your infection is getting worse, such as:  ? Increased pain, swelling, warmth, or redness. ? Red streaks leading from the area. ? Pus draining from the area. ? A fever. · You get a rash. Watch closely for changes in your health, and be sure to contact your doctor if:  · You do not get better as expected. Where can you learn more? Go to https://[employer]. org and sign in to your [employer] account. Enter M411 in the [facility] box to learn more about \"Cellulitis: Care Instructions. \"     If you do not have an account, please click on the \"Sign Up Now\" link. Current as of: October 31, 2019               Content Version: 12.5  © 7931-8687 Healthwise, Incorporated. Care instructions adapted under license by [facility] ([facility]). If you have questions about a medical condition or this instruction, always ask your healthcare professional. [address] any warranty or liability for your use of this information.

## 2020-08-18 NOTE — PROGRESS NOTES
Jaylon Gilmore  Phone [phone]   Fax [phone]      OFFICE VISIT: 2020    Camby File- : 1980    Chief Jacquelyn Garcia is a 44 y.o. male who is here for Skin Problem (right hand)     HPI  The patient presents today for evaluation of right forefinger. He had a cut on the finger approximately a week ago  Now the finger is red, swollen, and draining. \"I have been putting Bactroban ointment on it and it keeps getting worse. \"  There is \"yellowish crusting\" on the finger in the morning. \"I can't bend it. \"  It is painful. He is gardening all the time     height is 6' 1\" (1.854 m) and weight is 356 lb (161.5 kg) (abnormal). His temporal temperature is 97 °F (36.1 °C). His blood pressure is 130/80 and his pulse is 100. His oxygen saturation is 98%. Body mass index is 46.97 kg/m².     Results for orders placed or performed in visit on 19   TSH without Reflex   Result Value Ref Range    TSH 1.590 0.270 - 4.200 uIU/mL   T4, Free   Result Value Ref Range    T4 Free 1.1 0.9 - 1.7 ng/dL   Microalbumin / Creatinine Urine Ratio   Result Value Ref Range    Microalbumin, Random Urine <1.20 0.00 - 19.00 mg/dL    Creatinine, Ur 292.7 4.2 - 622.0 mg/dL    Microalbumin Creatinine Ratio see below mg/g   Lipid Panel   Result Value Ref Range    Cholesterol, Total 181 160 - 199 mg/dL    Triglycerides 130 0 - 149 mg/dL    HDL 54 (L) 55 - 121 mg/dL    LDL Calculated 101 <100 mg/dL   Comprehensive Metabolic Panel   Result Value Ref Range    Sodium 141 136 - 145 mmol/L    Potassium 4.1 3.5 - 5.0 mmol/L    Chloride 102 98 - 111 mmol/L    CO2 24 22 - 29 mmol/L    Anion Gap 15 7 - 19 mmol/L    Glucose 51 (L) 74 - 109 mg/dL    BUN 11 6 - 20 mg/dL    CREATININE 0.7 0.5 - 1.2 mg/dL    GFR Non-African American >60 >60    Calcium 8.9 8.6 - 10.0 mg/dL    Total Protein 7.2 6.6 - 8.7 g/dL    Alb 4.1 3.5 - 5.2 g/dL    Total Bilirubin 0.5 0.2 - 1.2 mg/dL    Alkaline Phosphatase 114 40 - 130 U/L ALT 20 5 - 41 U/L    AST 20 5 - 40 U/L   CBC Auto Differential   Result Value Ref Range    WBC 4.7 (L) 4.8 - 10.8 K/uL    RBC 4.94 4.70 - 6.10 M/uL    Hemoglobin 14.3 14.0 - 18.0 g/dL    Hematocrit 44.6 42.0 - 52.0 %    MCV 90.3 80.0 - 94.0 fL    MCH 28.9 27.0 - 31.0 pg    MCHC 32.1 (L) 33.0 - 37.0 g/dL    RDW 13.0 11.5 - 14.5 %    Platelets 629 452 - 911 K/uL    MPV 10.4 9.4 - 12.4 fL    Neutrophils % 61.8 50.0 - 65.0 %    Lymphocytes % 24.8 20.0 - 40.0 %    Monocytes % 7.9 0.0 - 10.0 %    Eosinophils % 4.5 0.0 - 5.0 %    Basophils % 0.6 0.0 - 1.0 %    Neutrophils Absolute 2.9 1.5 - 7.5 K/uL    Immature Granulocytes # 0.0 K/uL    Lymphocytes Absolute 1.2 1.1 - 4.5 K/uL    Monocytes Absolute 0.40 0.00 - 0.90 K/uL    Eosinophils Absolute 0.20 0.00 - 0.60 K/uL    Basophils Absolute 0.00 0.00 - 0.20 K/uL     have reviewed the following with the Mr. Ana Bourne   Lab Review   No visits with results within 6 Month(s) from this visit.    Latest known visit with results is:   Orders Only on 11/06/2019   Component Date Value    TSH 11/06/2019 1.590     T4 Free 11/06/2019 1.1     Microalbumin, Random Uri* 11/06/2019 <1.20     Creatinine, Ur 11/06/2019 292.7     Microalbumin Creatinine * 11/06/2019 see below     Cholesterol, Total 11/06/2019 181     Triglycerides 11/06/2019 130     HDL 11/06/2019 54*    LDL Calculated 11/06/2019 101     Sodium 11/06/2019 141     Potassium 11/06/2019 4.1     Chloride 11/06/2019 102     CO2 11/06/2019 24     Anion Gap 11/06/2019 15     Glucose 11/06/2019 51*    BUN 11/06/2019 11     CREATININE 11/06/2019 0.7     GFR Non- 11/06/2019 >60     Calcium 11/06/2019 8.9     Total Protein 11/06/2019 7.2     Alb 11/06/2019 4.1     Total Bilirubin 11/06/2019 0.5     Alkaline Phosphatase 11/06/2019 114     ALT 11/06/2019 20     AST 11/06/2019 20     WBC 11/06/2019 4.7*    RBC 11/06/2019 4.94     Hemoglobin 11/06/2019 14.3     Hematocrit 11/06/2019 44.6     MCV 11/06/2019 90.3     MCH 11/06/2019 28.9     [facility]    RDW 11/06/2019 13.0     Platelets 48/63/9376 229     MPV 11/06/2019 10.4     Neutrophils % 11/06/2019 61.8     Lymphocytes % 11/06/2019 24.8     Monocytes % 11/06/2019 7.9     Eosinophils % 11/06/2019 4.5     Basophils % 11/06/2019 0.6     Neutrophils Absolute 11/06/2019 2.9     Immature Granulocytes # 11/06/2019 0.0     Lymphocytes Absolute 11/06/2019 1.2     Monocytes Absolute 11/06/2019 0.40     Eosinophils Absolute 11/06/2019 0.20     Basophils Absolute 11/06/2019 0.00      Copies of these are in the chart. Prior to Visit Medications    Medication Sig Taking? Authorizing Provider   triamcinolone (KENALOG) 0.1 % ointment  Yes Historical Provider, MD   methylPREDNISolone (MEDROL DOSEPACK) 4 MG tablet Take by mouth. Yes LASHONDA Arrieta   doxycycline hyclate (VIBRAMYCIN) 100 MG capsule Take 1 capsule by mouth 2 times daily for 10 days Yes LASHONDA Arrieta   mupirocin (BACTROBAN) 2 % ointment APPLY ONE APPLICATION THREE TIMES DAILY. Yes LASHONDA Arrieta   pantoprazole (PROTONIX) 40 MG tablet Take 1 tablet by mouth 2 times daily Yes LASHONDA Arrieta   fluticasone (FLONASE) 50 MCG/ACT nasal spray INSTILL 2 SPRAYS IN EACH NOSTRIL DAILY Yes LASHONDA Sanon   SAXENDA 18 MG/3ML SOPN INJECT 1.2 MG INTO THE SKIN DAILY. Yes LASHONDA Lynne   hydrOXYzine (ATARAX) 50 MG tablet Take 1 tablet by mouth every 6 hours as needed for Itching Yes LASHONDA Arrieta   nystatin (MYCOSTATIN) 113867 UNIT/GM powder Apply topically 3 times daily Apply 3 times daily.  Yes LASHONDA Arrieta   epinastine (ELESTAT) 0.05 % SOLN PLACE ONE DROP IN BOTH EYES EVERY 6 HOURS Yes LASHONDA Arrieta   B-D ULTRAFINE III SHORT PEN 31G X 8 MM MISC  Yes Historical Provider, MD   cetirizine (ZYRTEC) 10 MG tablet Take 1 tablet by mouth daily Yes LASHONDA Arrieta   montelukast (SINGULAIR) 10 MG tablet Take 1 tablet by mouth daily Yes LASHONDA Arrieta   EPINEPHrine (EPIPEN 2-KEIKO) 0.3 MG/0.3ML SOAJ injection Inject 0.3 mLs into the skin once for 1 dose Use as directed for allergic reaction Yes LASHONDA Arrieta       Allergies: Cat hair extract; Dog epithelium; Penicillins; and Meat extract    Past Medical History:   Diagnosis Date    Hypertension        Past Surgical History:   Procedure Laterality Date    GASTRIC BYPASS SURGERY         Social History     Tobacco Use    Smoking status: Never Smoker    Smokeless tobacco: Never Used   Substance Use Topics    Alcohol use: Yes     Comment: rarely       No family history on file. Review of Systems   Constitutional: Negative for fever. HENT: Positive for congestion and rhinorrhea. Respiratory: Negative for cough and shortness of breath. Gastrointestinal: Negative for abdominal pain. Musculoskeletal: Positive for arthralgias (right forefinger). Skin: Positive for wound (right finger). Physical Exam  Vitals signs reviewed. Constitutional:       Appearance: He is well-developed. He is obese. HENT:      Head: Normocephalic. Right Ear: External ear normal.      Left Ear: External ear normal.      Nose: Nose normal.   Neck:      Musculoskeletal: Normal range of motion. Cardiovascular:      Rate and Rhythm: Normal rate and regular rhythm. Pulmonary:      Effort: Pulmonary effort is normal.      Breath sounds: Normal breath sounds. No wheezing, rhonchi or rales. Lymphadenopathy:      Cervical: No cervical adenopathy. Skin:     General: Skin is dry. Neurological:      General: No focal deficit present. Mental Status: He is alert and oriented to person, place, and time. Mental status is at baseline. Psychiatric:         Mood and Affect: Mood normal.         Behavior: Behavior normal.         Thought Content: Thought content normal.         Judgment: Judgment normal.            ASSESSMENT      ICD-10-CM    1.  Cellulitis of finger of right hand  L03.011 pain medicines exactly as directed. ? If the doctor gave you a prescription medicine for pain, take it as prescribed. ? If you are not taking a prescription pain medicine, ask your doctor if you can take an over-the-counter medicine. To prevent cellulitis in the future  · Try to prevent cuts, scrapes, or other injuries to your skin. Cellulitis most often occurs where there is a break in the skin. · If you get a scrape, cut, mild burn, or bite, wash the wound with clean water as soon as you can to help avoid infection. Don't use hydrogen peroxide or alcohol, which can slow healing. · If you have swelling in your legs (edema), support stockings and good skin care may help prevent leg sores and cellulitis. · Take care of your feet, especially if you have diabetes or other conditions that increase the risk of infection. Wear shoes and socks. Do not go barefoot. If you have athlete's foot or other skin problems on your feet, talk to your doctor about how to treat them. When should you call for help? Call your doctor now or seek immediate medical care if:  · You have signs that your infection is getting worse, such as:  ? Increased pain, swelling, warmth, or redness. ? Red streaks leading from the area. ? Pus draining from the area. ? A fever. · You get a rash. Watch closely for changes in your health, and be sure to contact your doctor if:  · You do not get better as expected. Where can you learn more? Go to [email or web address] and sign in to your [employer] account. Enter P686 in the [facility] box to learn more about \"Cellulitis: Care Instructions. \"     If you do not have an account, please click on the \"Sign Up Now\" link. Current as of: October 31, 2019               Content Version: 12.5  © 0674-8669 Healthwise, Incorporated. Care instructions adapted under license by [facility] ([facility]).  If you have questions about a medical condition or this instruction, always ask your healthcare professional. [place] [address] any warranty or liability for your use of this information. Controlled Substances Monitoring:  n/a            Additional Instructions: As always, patient is advised to bring in medication bottles in order to correctly reconcile with our current list.    Nelson Linder received counseling on the following healthy behaviors: n/a    Patient given educational materials on dx    I have instructed Nelson Linder to complete a self tracking handout on n/a and instructed them to bring it with them to his next appointment. Discussed use, benefit, and side effects of prescribed medications. Barriers to medication compliance addressed. All patient questions answered. Pt voiced understanding.      LASHONDA Williamson

## 2020-08-23 NOTE — PROGRESS NOTES
[address], [facility] [address]  Phone [phone] Fax [phone]     Patient Name: Bill Roibns 2020  : 1980  Age: 44 y.o.   Patient Address: [address] [place] [address] Road [facility]       Patient Phone: [phone] (home)     REFERRAL  Referred to: DME provider of patient's choice  Bill Robins is referred for the following:    DME Equipment HPCPS Code Setting   Auto Adjusting CPAP device with flex or comparable pressure relief per comfort  8cm to 20cm   Heated Humidifier  Patient Choice       Replinishible PAP Supplies, 1 year supply  Item HPCPS Code Frequency   Mask of choice  or  1 per 3 months   Nasal Mask cushion/pillows  or  2 per 30 days   Full Face Mask Interface  1 per 30 days   Headgear  1 per 6 months   Tubing, length of choice  or  1 per 3 months   Water Chamber  1 per 6 months   Chinstrap  1 per 6 months   Disposable Filters  2 per 30 days   Reusable Filters  1 per 6 months     Diagnoses:  Obstructive sleep apnea (G47.33)  Length of Need: Lifetime, 99    Ordering Provider: KIMI Sethi Stake: [id number]         Signature:       Date: 2020      Electronically Signed by Zane Fuentes M.D.

## 2020-09-04 ENCOUNTER — TELEPHONE (OUTPATIENT)
Dept: PRIMARY CARE CLINIC | Age: 40
End: 2020-09-04

## 2020-09-04 NOTE — TELEPHONE ENCOUNTER
Was he able to wear it very long? He will likely need to do a VV. I am sure I have openings on Tuesday.     Thanks,  Keila Trujillo

## 2020-09-04 NOTE — TELEPHONE ENCOUNTER
Pt called, he got his CPAP machine and used it last night and feels like he is going to have a panic attack

## 2020-09-04 NOTE — TELEPHONE ENCOUNTER
Called pt back, he went and got just the nasal mask today and he is going to try it. He said if that doesn't work, he will let us know. And it if doesn't, he is going to throw it and it'll be hell bent and crooked.  (go never heard that one before)

## 2020-09-09 ENCOUNTER — TELEPHONE (OUTPATIENT)
Dept: NEUROLOGY | Age: 40
End: 2020-09-09

## 2020-09-11 ENCOUNTER — OFFICE VISIT (OUTPATIENT)
Dept: PRIMARY CARE CLINIC | Age: 40
End: 2020-09-11
Payer: COMMERCIAL

## 2020-09-11 VITALS
HEIGHT: 73 IN | SYSTOLIC BLOOD PRESSURE: 130 MMHG | DIASTOLIC BLOOD PRESSURE: 80 MMHG | TEMPERATURE: 97.4 F | BODY MASS INDEX: 41.75 KG/M2 | WEIGHT: 315 LBS | HEART RATE: 71 BPM | OXYGEN SATURATION: 98 %

## 2020-09-11 PROCEDURE — 99213 OFFICE O/P EST LOW 20 MIN: CPT | Performed by: NURSE PRACTITIONER

## 2020-09-11 RX ORDER — DIAZEPAM 5 MG/1
5 TABLET ORAL NIGHTLY PRN
Qty: 30 TABLET | Refills: 0 | Status: SHIPPED | OUTPATIENT
Start: 2020-09-11 | End: 2020-10-05 | Stop reason: SDUPTHER

## 2020-09-11 ASSESSMENT — ENCOUNTER SYMPTOMS
VOMITING: 0
CONSTIPATION: 0
RHINORRHEA: 0
DIARRHEA: 0
COUGH: 0
EYE REDNESS: 0
SHORTNESS OF BREATH: 0
SORE THROAT: 0

## 2020-09-11 NOTE — PROGRESS NOTES
Jaylon Fulton  Phone [phone]   Fax [phone]      OFFICE VISIT: 2020    Marcelino Thakur- : 1980    Chief Marc Rowan is a 44 y.o. male who is here for Follow-up (CPAP)     HPI  The patient presents today for follow-up of sleep apnea. \"I can't tolerate the nasal mask. \"  \"I don't like it. \"  \"It feels like it stops me up. \"  \"I have been sleeping with cool mist humidifier. \"  \"I take the mask off and I sleep great. \"  \"I have been walking 2-3 miles in the afternoon. I am going back to the gym. \"   \"I am sleeping better. \"  He is down 5 pounds in the last 3 weeks. \"I am not an anxious person. \"    He is allergic to dogs, but he still has dogs in his house. 2020  Chest Xray: WNL     height is 6' 1\" (1.854 m) and weight is 351 lb 4 oz (159.3 kg) (abnormal). His temporal temperature is 97.4 °F (36.3 °C). His blood pressure is 130/80 and his pulse is 71. His oxygen saturation is 98%. Body mass index is 46.34 kg/m².     Results for orders placed or performed in visit on 19   TSH without Reflex   Result Value Ref Range    TSH 1.590 0.270 - 4.200 uIU/mL   T4, Free   Result Value Ref Range    T4 Free 1.1 0.9 - 1.7 ng/dL   Microalbumin / Creatinine Urine Ratio   Result Value Ref Range    Microalbumin, Random Urine <1.20 0.00 - 19.00 mg/dL    Creatinine, Ur 292.7 4.2 - 622.0 mg/dL    Microalbumin Creatinine Ratio see below mg/g   Lipid Panel   Result Value Ref Range    Cholesterol, Total 181 160 - 199 mg/dL    Triglycerides 130 0 - 149 mg/dL    HDL 54 (L) 55 - 121 mg/dL    LDL Calculated 101 <100 mg/dL   Comprehensive Metabolic Panel   Result Value Ref Range    Sodium 141 136 - 145 mmol/L    Potassium 4.1 3.5 - 5.0 mmol/L    Chloride 102 98 - 111 mmol/L    CO2 24 22 - 29 mmol/L    Anion Gap 15 7 - 19 mmol/L    Glucose 51 (L) 74 - 109 mg/dL    BUN 11 6 - 20 mg/dL    CREATININE 0.7 0.5 - 1.2 mg/dL    GFR Non-African American >60 >60    Calcium 8.9 8.6 - 10.0 mg/dL Total Protein 7.2 6.6 - 8.7 g/dL    Alb 4.1 3.5 - 5.2 g/dL    Total Bilirubin 0.5 0.2 - 1.2 mg/dL    Alkaline Phosphatase 114 40 - 130 U/L    ALT 20 5 - 41 U/L    AST 20 5 - 40 U/L   CBC Auto Differential   Result Value Ref Range    WBC 4.7 (L) 4.8 - 10.8 K/uL    RBC 4.94 4.70 - 6.10 M/uL    Hemoglobin 14.3 14.0 - 18.0 g/dL    Hematocrit 44.6 42.0 - 52.0 %    MCV 90.3 80.0 - 94.0 fL    MCH 28.9 27.0 - 31.0 pg    MCHC 32.1 (L) 33.0 - 37.0 g/dL    RDW 13.0 11.5 - 14.5 %    Platelets 370 214 - 513 K/uL    MPV 10.4 9.4 - 12.4 fL    Neutrophils % 61.8 50.0 - 65.0 %    Lymphocytes % 24.8 20.0 - 40.0 %    Monocytes % 7.9 0.0 - 10.0 %    Eosinophils % 4.5 0.0 - 5.0 %    Basophils % 0.6 0.0 - 1.0 %    Neutrophils Absolute 2.9 1.5 - 7.5 K/uL    Immature Granulocytes # 0.0 K/uL    Lymphocytes Absolute 1.2 1.1 - 4.5 K/uL    Monocytes Absolute 0.40 0.00 - 0.90 K/uL    Eosinophils Absolute 0.20 0.00 - 0.60 K/uL    Basophils Absolute 0.00 0.00 - 0.20 K/uL     have reviewed the following with the Mr. Yin Lay   Lab Review   No visits with results within 6 Month(s) from this visit.    Latest known visit with results is:   Orders Only on 11/06/2019   Component Date Value    TSH 11/06/2019 1.590     T4 Free 11/06/2019 1.1     Microalbumin, Random Uri* 11/06/2019 <1.20     Creatinine, Ur 11/06/2019 292.7     Microalbumin Creatinine * 11/06/2019 see below     Cholesterol, Total 11/06/2019 181     Triglycerides 11/06/2019 130     HDL 11/06/2019 54*    LDL Calculated 11/06/2019 101     Sodium 11/06/2019 141     Potassium 11/06/2019 4.1     Chloride 11/06/2019 102     CO2 11/06/2019 24     Anion Gap 11/06/2019 15     Glucose 11/06/2019 51*    BUN 11/06/2019 11     CREATININE 11/06/2019 0.7     GFR Non- 11/06/2019 >60     Calcium 11/06/2019 8.9     Total Protein 11/06/2019 7.2     Alb 11/06/2019 4.1     Total Bilirubin 11/06/2019 0.5     Alkaline Phosphatase 11/06/2019 114     ALT 11/06/2019 20     AST 11/06/2019 20     WBC 11/06/2019 4.7*    RBC 11/06/2019 4.94     Hemoglobin 11/06/2019 14.3     Hematocrit 11/06/2019 44.6     MCV 11/06/2019 90.3     MCH 11/06/2019 28.9     MCHC 11/06/2019 32.1*    RDW 11/06/2019 13.0     Platelets 44/25/0521 229     MPV 11/06/2019 10.4     Neutrophils % 11/06/2019 61.8     Lymphocytes % 11/06/2019 24.8     Monocytes % 11/06/2019 7.9     Eosinophils % 11/06/2019 4.5     Basophils % 11/06/2019 0.6     Neutrophils Absolute 11/06/2019 2.9     Immature Granulocytes # 11/06/2019 0.0     Lymphocytes Absolute 11/06/2019 1.2     Monocytes Absolute 11/06/2019 0.40     Eosinophils Absolute 11/06/2019 0.20     Basophils Absolute 11/06/2019 0.00      Copies of these are in the chart. Prior to Visit Medications    Medication Sig Taking? Authorizing Provider   diazePAM (VALIUM) 5 MG tablet Take 1 tablet by mouth nightly as needed for Anxiety or Sleep for up to 30 days. Yes LASHONDA Arrieta   triamcinolone (KENALOG) 0.1 % ointment  Yes Historical Provider, MD   mupirocin (BACTROBAN) 2 % ointment APPLY ONE APPLICATION THREE TIMES DAILY. Yes LASHONDA Arrieta   pantoprazole (PROTONIX) 40 MG tablet Take 1 tablet by mouth 2 times daily Yes LASHONDA Arrieta   fluticasone (FLONASE) 50 MCG/ACT nasal spray INSTILL 2 SPRAYS IN EACH NOSTRIL DAILY Yes LASHONDA New   SAXENDA 18 MG/3ML SOPN INJECT 1.2 MG INTO THE SKIN DAILY. Yes LASHONDA Rodriguez   hydrOXYzine (ATARAX) 50 MG tablet Take 1 tablet by mouth every 6 hours as needed for Itching Yes LASHONDA Arrieta   nystatin (MYCOSTATIN) 737843 UNIT/GM powder Apply topically 3 times daily Apply 3 times daily.  Yes LASHONDA Arrieta   epinastine (ELESTAT) 0.05 % SOLN PLACE ONE DROP IN BOTH EYES EVERY 6 HOURS Yes LASHONDA Arrieta   B-D ULTRAFINE III SHORT PEN 31G X 8 MM MISC  Yes Historical Provider, MD   cetirizine (ZYRTEC) 10 MG tablet Take 1 tablet by mouth daily Yes LASHONDA Kc montelukast (SINGULAIR) 10 MG tablet Take 1 tablet by mouth daily Yes LASHONDA Arrieta   EPINEPHrine (EPIPEN 2-KEIKO) 0.3 MG/0.3ML SOAJ injection Inject 0.3 mLs into the skin once for 1 dose Use as directed for allergic reaction Yes LASHONDA Arrieta       Allergies: Cat hair extract; Dog epithelium; Penicillins; and Meat extract    Past Medical History:   Diagnosis Date    Hypertension        Past Surgical History:   Procedure Laterality Date    GASTRIC BYPASS SURGERY         Social History     Tobacco Use    Smoking status: Never Smoker    Smokeless tobacco: Never Used   Substance Use Topics    Alcohol use: Yes     Comment: rarely       No family history on file. Review of Systems   Constitutional: Negative for chills, fatigue and fever. HENT: Negative for congestion, ear pain, rhinorrhea and sore throat. Eyes: Negative for redness. Respiratory: Negative for cough and shortness of breath. Cardiovascular: Negative for chest pain. Gastrointestinal: Negative for constipation, diarrhea and vomiting. Skin: Negative for rash. Neurological: Negative for dizziness and headaches. Psychiatric/Behavioral: Positive for sleep disturbance (improved). The patient is nervous/anxious. Physical Exam  Vitals signs reviewed. Constitutional:       Appearance: He is well-developed. He is obese. HENT:      Head: Normocephalic. Right Ear: Tympanic membrane and external ear normal.      Left Ear: Tympanic membrane and external ear normal.      Nose: Nose normal.   Eyes:      General:         Right eye: No discharge. Left eye: No discharge. Neck:      Musculoskeletal: Normal range of motion. Cardiovascular:      Rate and Rhythm: Normal rate and regular rhythm. Pulmonary:      Effort: Pulmonary effort is normal.      Breath sounds: Normal breath sounds. No wheezing, rhonchi or rales. Abdominal:      General: Bowel sounds are normal.      Palpations: Abdomen is soft. Lymphadenopathy:      Cervical: No cervical adenopathy. Skin:     General: Skin is dry. Neurological:      General: No focal deficit present. Mental Status: He is alert and oriented to person, place, and time. Mental status is at baseline. Psychiatric:         Mood and Affect: Mood normal.         Behavior: Behavior normal.         Thought Content: Thought content normal.         Judgment: Judgment normal.       ASSESSMENT      ICD-10-CM    1. Obstructive sleep apnea syndrome  G47.33 diazePAM (VALIUM) 5 MG tablet   2. Class 3 severe obesity due to excess calories without serious comorbidity with body mass index (BMI) of 45.0 to 49.9 in adult [facility])  E66.01 Recommend diet and exercise    Z68.42    3. Primary insomnia  F51.01 diazePAM (VALIUM) 5 MG tablet   4. Anxiety  F41.9 diazePAM (VALIUM) 5 MG tablet   5. Seasonal allergic rhinitis due to pollen  J30.1 Continue Singulair 10 mg  Continue Zyrtec 10 mg  Continue Flonase         PLAN    No orders of the defined types were placed in this encounter. Return in about 1 month (around 10/11/2020), or if symptoms worsen or fail to improve. Patient Instructions     Patient Education        Learning About CPAP for Sleep Apnea  What is CPAP? CPAP is a small machine that you use at home every night while you sleep. It increases air pressure in your throat to keep your airway open. When you have sleep apnea, this can help you sleep better so you feel much better. CPAP stands for \"continuous positive airway pressure. \"  The CPAP machine will have one of the following:  · A mask that covers your nose and mouth  · Prongs that fit into your nose  · A mask that covers your nose only, the most common type. This type is called NCPAP. The N stands for \"nasal.\"  Why is it done? CPAP is usually the best treatment for obstructive sleep apnea. It is the first treatment choice and the most widely used.  Your doctor may suggest CPAP if you have:  · Moderate to severe sleep apnea. · Sleep apnea and coronary artery disease (CAD). · Sleep apnea and heart failure. How does it help? · CPAP can help you have more normal sleep, so you feel less sleepy and more alert during the daytime. · CPAP may help keep heart failure or other heart problems from getting worse. · CPAP may help lower your blood pressure. · If you use CPAP, your bed partner may also sleep better because you are not snoring or restless. What are the side effects? Some people who use CPAP have:  · A dry or stuffy nose and a sore throat. · Irritated skin on the face. · Sore eyes. · Bloating. If you have any of these problems, work with your doctor to fix them. Here are some things you can try:  · Be sure the mask or nasal prongs fit well. · See if your doctor can adjust the pressure of your CPAP. · If your nose is dry, try a humidifier. · If your nose is runny or stuffy, try decongestant medicine or a steroid nasal spray. Be safe with medicines. Read and follow all instructions on the label. Do not use the medicine longer than the label says. If these things do not help, you might try a different type of machine. Some machines have air pressure that adjusts on its own. Others have air pressures that are different when you breathe in than when you breathe out. This may reduce discomfort caused by too much pressure in your nose. Where can you learn more? Go to https://[employer].Heyzap. org and sign in to your Identia account. Enter O140 in the [employer] box to learn more about \"Learning About CPAP for Sleep Apnea. \"     If you do not have an account, please click on the \"Sign Up Now\" link. Current as of: February 24, 2020               Content Version: 12.5  © 3348-5220 Healthwise, Incorporated. Care instructions adapted under license by [employer] [facility] ([facility]).  If you have questions about a medical condition or this instruction, always ask your healthcare professional. [employer] disclaims any warranty or liability for your use of this information. Controlled Substances Monitoring: Attestation: The Prescription Monitoring Report for this patient was reviewed today. ([id number]) [facility], APRN)            Additional Instructions: As always, patient is advised to bring in medication bottles in order to correctly reconcile with our current list.    Jean Claude Pierre received counseling on the following healthy behaviors: n/a    Patient given educational materials on dx    I have instructed Jean Claude Pierre to complete a self tracking handout on n/a and instructed them to bring it with them to his next appointment. Discussed use, benefit, and side effects of prescribed medications. Barriers to medication compliance addressed. All patient questions answered. Pt voiced understanding.      [facility], APRN

## 2020-09-11 NOTE — PATIENT INSTRUCTIONS
Some machines have air pressure that adjusts on its own. Others have air pressures that are different when you breathe in than when you breathe out. This may reduce discomfort caused by too much pressure in your nose. Where can you learn more? Go to https://chkenjieb.[employer]. org and sign in to your Wh[employer]are account. Enter I688 in the [employer] box to learn more about \"Learning About CPAP for Sleep Apnea. \"     If you do not have an account, please click on the \"Sign Up Now\" link. Current as of: February 24, 2020               Content Version: 12.5  © 2006-2020 Healthwise, Incorporated. Care instructions adapted under license by [facility] ([facility]). If you have questions about a medical condition or this instruction, always ask your healthcare professional. [address] any warranty or liability for your use of this information.

## 2020-09-14 RX ORDER — PEN NEEDLE, DIABETIC 31 GX5/16"
NEEDLE, DISPOSABLE MISCELLANEOUS
Qty: 100 EACH | Refills: 1 | Status: SHIPPED | OUTPATIENT
Start: 2020-09-14 | End: 2021-04-26

## 2020-09-14 NOTE — TELEPHONE ENCOUNTER
Received fax from pharmacy requesting refill on pts medication(s). Pt was last seen in office on 9/11/2020  and has a follow up scheduled for 10/13/2020. Will send request to  [facility]  for patient.      Requested Prescriptions     Pending Prescriptions Disp Refills    mupirocin (BACTROBAN) 2 % ointment [Pharmacy Med Name: MUPIROCIN 2 % OINT 2 OINT] 22 g 1     Sig: APPLY TO AFFECTED AREA THREE TIMES DAILY    B-D ULTRAFINE III SHORT PEN 31G X 8 MM MISC [Pharmacy Med Name: BD ULTRA-FINE PEN NDL [id number] 31G X 8 MM MISC] 100 each 1     Sig: USE SAXENDA ONCE DAILY

## 2020-09-18 RX ORDER — LIRAGLUTIDE 6 MG/ML
1.2 INJECTION, SOLUTION SUBCUTANEOUS DAILY
Qty: 6 ML | Refills: 5 | Status: SHIPPED | OUTPATIENT
Start: 2020-09-18 | End: 2020-11-13 | Stop reason: SDUPTHER

## 2020-09-18 RX ORDER — MONTELUKAST SODIUM 10 MG/1
10 TABLET ORAL NIGHTLY
Qty: 30 TABLET | Refills: 11 | Status: SHIPPED | OUTPATIENT
Start: 2020-09-18 | End: 2021-08-25

## 2020-09-18 RX ORDER — CETIRIZINE HYDROCHLORIDE 10 MG/1
10 TABLET ORAL DAILY
Qty: 30 TABLET | Refills: 11 | Status: SHIPPED | OUTPATIENT
Start: 2020-09-18 | End: 2020-10-05 | Stop reason: SDUPTHER

## 2020-09-18 NOTE — TELEPHONE ENCOUNTER
Received fax from pharmacy requesting refill on pts medication(s). Pt was last seen in office on 9/11/2020  and has a follow up scheduled for 9/18/2020. Will send request to  [facility]  for patient.      Requested Prescriptions     Pending Prescriptions Disp Refills    cetirizine (ZYRTEC) 10 MG tablet [Pharmacy Med Name: CETIRIZINE HCL 10 MG TABS 10 TAB] 30 tablet 11     Sig: TAKE ONE TABLET BY MOUTH DAILY    montelukast (SINGULAIR) 10 MG tablet [Pharmacy Med Name: MONTELUKAST SOD 10 MG TAB 10 TAB] 30 tablet 11     Sig: TAKE ONE TABLET BY MOUTH DAILY

## 2020-09-18 NOTE — TELEPHONE ENCOUNTER
Received fax from pharmacy requesting refill on pts medication(s). Pt was last seen in office on 9/11/2020  and has a follow up scheduled for 10/13/2020. Will send request to  [facility]  for patient. Requested Prescriptions     Pending Prescriptions Disp Refills    SAXENDA 18 MG/3ML SOPN [Pharmacy Med Name: Andrew Joes 18 MG/3ML SOPN 18 SOPN] 6 mL 5     Sig: INJECT 1.2 MG INTO THE SKIN DAILY.

## 2020-09-30 ENCOUNTER — TELEPHONE (OUTPATIENT)
Dept: PRIMARY CARE CLINIC | Age: 40
End: 2020-09-30

## 2020-09-30 NOTE — TELEPHONE ENCOUNTER
Pt left ms wanting his labs and physical faxed to [facility] . Call placed to pt to make sure he wanted last years and he did. Sent these to pt and he will send to [place].

## 2020-10-05 RX ORDER — CETIRIZINE HYDROCHLORIDE 10 MG/1
10 TABLET ORAL DAILY
Qty: 30 TABLET | Refills: 11 | Status: SHIPPED | OUTPATIENT
Start: 2020-10-05 | End: 2021-09-23

## 2020-10-05 RX ORDER — DIAZEPAM 5 MG/1
5 TABLET ORAL NIGHTLY PRN
Qty: 30 TABLET | Refills: 0 | Status: SHIPPED | OUTPATIENT
Start: 2020-10-05 | End: 2020-10-13

## 2020-10-05 NOTE — TELEPHONE ENCOUNTER
Received fax from pharmacy requesting refill on pts medication(s). Pt was last seen in office on 9/11/2020  and has a follow up scheduled for 10/13/2020. Will send request to  [facility]  for authorization. Requested Prescriptions     Pending Prescriptions Disp Refills    diazePAM (VALIUM) 5 MG tablet 30 tablet 0     Sig: Take 1 tablet by mouth nightly as needed for Anxiety or Sleep for up to 30 days.     cetirizine (ZYRTEC) 10 MG tablet 30 tablet 11     Sig: Take 1 tablet by mouth daily

## 2020-10-13 ENCOUNTER — OFFICE VISIT (OUTPATIENT)
Dept: PRIMARY CARE CLINIC | Age: 40
End: 2020-10-13
Payer: COMMERCIAL

## 2020-10-13 VITALS
BODY MASS INDEX: 41.75 KG/M2 | OXYGEN SATURATION: 97 % | SYSTOLIC BLOOD PRESSURE: 128 MMHG | HEART RATE: 79 BPM | WEIGHT: 315 LBS | DIASTOLIC BLOOD PRESSURE: 84 MMHG | HEIGHT: 73 IN | TEMPERATURE: 97 F

## 2020-10-13 PROCEDURE — 99213 OFFICE O/P EST LOW 20 MIN: CPT | Performed by: NURSE PRACTITIONER

## 2020-10-13 RX ORDER — EPINASTINE HCL 0.05 %
1 DROPS OPHTHALMIC (EYE) 2 TIMES DAILY
Qty: 1 BOTTLE | Refills: 1 | Status: SHIPPED | OUTPATIENT
Start: 2020-10-13 | End: 2020-11-24

## 2020-10-13 RX ORDER — HYDROXYZINE 50 MG/1
50 TABLET, FILM COATED ORAL EVERY 6 HOURS PRN
Qty: 60 TABLET | Refills: 5 | Status: SHIPPED | OUTPATIENT
Start: 2020-10-13 | End: 2021-05-20

## 2020-10-13 ASSESSMENT — ENCOUNTER SYMPTOMS
DIARRHEA: 0
CONSTIPATION: 0
SORE THROAT: 0
EYE REDNESS: 0
COUGH: 0
RHINORRHEA: 0
VOMITING: 0
ABDOMINAL PAIN: 0
SHORTNESS OF BREATH: 0

## 2020-10-13 NOTE — PROGRESS NOTES
[address]  [address]  Phone [phone]   Fax [phone]      OFFICE VISIT: 10/13/2020    Nate Kimball- : 1980    Chief Chas Diaz is a 44 y.o. male who is here for 1 Month Follow-Up (discuss valium and vistaril )     HPI  The patient presents today for follow-up of moods. \"I feel great. \"  \"I am exercising everyday. \"  \"I am doing cardio every day. \"    He is tolerating his CPAP a little better. \"I am sleeping 4-5 hours with it. \"  \"I have got energy. \"    Allergies are stable. height is 6' 1\" (1.854 m) and weight is 358 lb (162.4 kg) (abnormal). His temporal temperature is 97 °F (36.1 °C). His blood pressure is 128/84 and his pulse is 79. His oxygen saturation is 97%. Body mass index is 47.23 kg/m².     Results for orders placed or performed in visit on 19   TSH without Reflex   Result Value Ref Range    TSH 1.590 0.270 - 4.200 uIU/mL   T4, Free   Result Value Ref Range    T4 Free 1.1 0.9 - 1.7 ng/dL   Microalbumin / Creatinine Urine Ratio   Result Value Ref Range    Microalbumin, Random Urine <1.20 0.00 - 19.00 mg/dL    Creatinine, Ur 292.7 4.2 - 622.0 mg/dL    Microalbumin Creatinine Ratio see below mg/g   Lipid Panel   Result Value Ref Range    Cholesterol, Total 181 160 - 199 mg/dL    Triglycerides 130 0 - 149 mg/dL    HDL 54 (L) 55 - 121 mg/dL    LDL Calculated 101 <100 mg/dL   Comprehensive Metabolic Panel   Result Value Ref Range    Sodium 141 136 - 145 mmol/L    Potassium 4.1 3.5 - 5.0 mmol/L    Chloride 102 98 - 111 mmol/L    CO2 24 22 - 29 mmol/L    Anion Gap 15 7 - 19 mmol/L    Glucose 51 (L) 74 - 109 mg/dL    BUN 11 6 - 20 mg/dL    CREATININE 0.7 0.5 - 1.2 mg/dL    GFR Non-African American >60 >60    Calcium 8.9 8.6 - 10.0 mg/dL    Total Protein 7.2 6.6 - 8.7 g/dL    Alb 4.1 3.5 - 5.2 g/dL    Total Bilirubin 0.5 0.2 - 1.2 mg/dL    Alkaline Phosphatase 114 40 - 130 U/L    ALT 20 5 - 41 U/L    AST 20 5 - 40 U/L   CBC Auto Differential   Result Value Ref Range    WBC 4.7 (L) 4.8 - 10.8 K/uL    RBC 4.94 4.70 - 6.10 M/uL    Hemoglobin 14.3 14.0 - 18.0 g/dL    Hematocrit 44.6 42.0 - 52.0 %    MCV 90.3 80.0 - 94.0 fL    MCH 28.9 27.0 - 31.0 pg    MCHC 32.1 (L) 33.0 - 37.0 g/dL    RDW 13.0 11.5 - 14.5 %    Platelets 947 662 - 818 K/uL    MPV 10.4 9.4 - 12.4 fL    Neutrophils % 61.8 50.0 - 65.0 %    Lymphocytes % 24.8 20.0 - 40.0 %    Monocytes % 7.9 0.0 - 10.0 %    Eosinophils % 4.5 0.0 - 5.0 %    Basophils % 0.6 0.0 - 1.0 %    Neutrophils Absolute 2.9 1.5 - 7.5 K/uL    Immature Granulocytes # 0.0 K/uL    Lymphocytes Absolute 1.2 1.1 - 4.5 K/uL    Monocytes Absolute 0.40 0.00 - 0.90 K/uL    Eosinophils Absolute 0.20 0.00 - 0.60 K/uL    Basophils Absolute 0.00 0.00 - 0.20 K/uL     have reviewed the following with the Mr. Silvestre Linnea   Lab Review   No visits with results within 6 Month(s) from this visit.    Latest known visit with results is:   Orders Only on 11/06/2019   Component Date Value    TSH 11/06/2019 1.590     T4 Free 11/06/2019 1.1     Microalbumin, Random Uri* 11/06/2019 <1.20     Creatinine, Ur 11/06/2019 292.7     Microalbumin Creatinine * 11/06/2019 see below     Cholesterol, Total 11/06/2019 181     Triglycerides 11/06/2019 130     HDL 11/06/2019 54*    LDL Calculated 11/06/2019 101     Sodium 11/06/2019 141     Potassium 11/06/2019 4.1     Chloride 11/06/2019 102     CO2 11/06/2019 24     Anion Gap 11/06/2019 15     Glucose 11/06/2019 51*    BUN 11/06/2019 11     CREATININE 11/06/2019 0.7     GFR Non- 11/06/2019 >60     Calcium 11/06/2019 8.9     Total Protein 11/06/2019 7.2     Alb 11/06/2019 4.1     Total Bilirubin 11/06/2019 0.5     Alkaline Phosphatase 11/06/2019 114     ALT 11/06/2019 20     AST 11/06/2019 20     WBC 11/06/2019 4.7*    RBC 11/06/2019 4.94     Hemoglobin 11/06/2019 14.3     Hematocrit 11/06/2019 44.6     MCV 11/06/2019 90.3     MCH 11/06/2019 28.9     MCHC 11/06/2019 32.1*    RDW 11/06/2019 13.0     Platelets 89/23/3078 229     MPV 11/06/2019 10.4     Neutrophils % 11/06/2019 61.8     Lymphocytes % 11/06/2019 24.8     Monocytes % 11/06/2019 7.9     Eosinophils % 11/06/2019 4.5     Basophils % 11/06/2019 0.6     Neutrophils Absolute 11/06/2019 2.9     Immature Granulocytes # 11/06/2019 0.0     Lymphocytes Absolute 11/06/2019 1.2     Monocytes Absolute 11/06/2019 0.40     Eosinophils Absolute 11/06/2019 0.20     Basophils Absolute 11/06/2019 0.00      Copies of these are in the chart. Prior to Visit Medications    Medication Sig Taking? Authorizing Provider   hydrOXYzine (ATARAX) 50 MG tablet Take 1 tablet by mouth every 6 hours as needed for Itching or Anxiety Yes LASHONDA Arrieta   epinastine (ELESTAT) 0.05 % SOLN Place 1 drop into both eyes 2 times daily Yes LASHONDA Arrieta   cetirizine (ZYRTEC) 10 MG tablet Take 1 tablet by mouth daily Yes LASHONDA Trujillo   montelukast (SINGULAIR) 10 MG tablet Take 1 tablet by mouth nightly Yes LASHONDA Arrieta   liraglutide-weight management (SAXENDA) 18 MG/3ML SOPN Inject 1.2 mg as directed daily Yes LASHONDA Arrieta   mupirocin (BACTROBAN) 2 % ointment APPLY TO AFFECTED AREA THREE TIMES DAILY Yes LASHONDA Trujillo   B-D ULTRAFINE III SHORT PEN 31G X 8 MM MISC USE SAXENDA ONCE DAILY Yes LASHONDA Trujillo   triamcinolone (KENALOG) 0.1 % ointment  Yes Historical Provider, MD   pantoprazole (PROTONIX) 40 MG tablet Take 1 tablet by mouth 2 times daily Yes LASHONDA Arrieta   fluticasone (FLONASE) 50 MCG/ACT nasal spray INSTILL 2 SPRAYS IN EACH NOSTRIL DAILY Yes LASHONDA Trujillo   nystatin (MYCOSTATIN) 452464 UNIT/GM powder Apply topically 3 times daily Apply 3 times daily.  Yes LASHONDA Arrieta   EPINEPHrine (EPIPEN 2-KEIKO) 0.3 MG/0.3ML SOAJ injection Inject 0.3 mLs into the skin once for 1 dose Use as directed for allergic reaction Yes LASHONDA Arrieta       Allergies: Cat hair extract; Dog epithelium; Penicillins; and Meat extract    Past Medical History:   Diagnosis Date    Hypertension        Past Surgical History:   Procedure Laterality Date    GASTRIC BYPASS SURGERY         Social History     Tobacco Use    Smoking status: Never Smoker    Smokeless tobacco: Never Used   Substance Use Topics    Alcohol use: Yes     Comment: rarely       No family history on file. Review of Systems   Constitutional: Negative for chills, fatigue and fever. HENT: Negative for congestion, ear pain, rhinorrhea and sore throat. Eyes: Negative for redness. Respiratory: Negative for cough and shortness of breath. Cardiovascular: Negative for chest pain. Gastrointestinal: Negative for abdominal pain, constipation, diarrhea and vomiting. Genitourinary: Negative for dysuria. Skin: Negative for rash. Neurological: Negative for dizziness and headaches. Psychiatric/Behavioral: Positive for sleep disturbance (improved). The patient is nervous/anxious (improved). Physical Exam  Vitals signs reviewed. Constitutional:       Appearance: He is well-developed. He is obese. HENT:      Head: Normocephalic. Right Ear: External ear normal.      Left Ear: External ear normal.      Nose: Nose normal.   Eyes:      General:         Right eye: No discharge. Left eye: No discharge. Neck:      Musculoskeletal: Normal range of motion. Cardiovascular:      Rate and Rhythm: Normal rate and regular rhythm. Pulmonary:      Effort: Pulmonary effort is normal.      Breath sounds: Normal breath sounds. No wheezing, rhonchi or rales. Abdominal:      General: Bowel sounds are normal.      Palpations: Abdomen is soft. Lymphadenopathy:      Cervical: No cervical adenopathy. Skin:     General: Skin is dry. Neurological:      General: No focal deficit present. Mental Status: He is alert and oriented to person, place, and time. Mental status is at baseline.    Psychiatric:         Mood and Affect: Mood normal.         Behavior: Behavior normal.         Thought Content: Thought content normal.         Judgment: Judgment normal.       ASSESSMENT      ICD-10-CM    1. Obstructive sleep apnea syndrome  G47.33 Recommended CPAP nightly   2. Primary insomnia  F51.01    3. Anxiety  F41.9 Vistaril prn   4. Rash due to allergy  T78.40XA hydrOXYzine (ATARAX) 50 MG tablet    R21    5. Environmental allergies  Z91.09 hydrOXYzine (ATARAX) 50 MG tablet   6. Allergic conjunctivitis of both eyes  H10.13 epinastine (ELESTAT) 0.05 % SOLN         PLAN    Orders Placed This Encounter   Procedures    CBC Auto Differential    Comprehensive Metabolic Panel    TSH without Reflex    T4, Free    Lipid Panel    Microalbumin / Creatinine Urine Ratio    Hemoglobin A1C        Return in about 1 month (around 11/13/2020), or if symptoms worsen or fail to improve, for Physical, 30 minute appointment. There are no Patient Instructions on file for this visit. Controlled Substances Monitoring:    n/a          Additional Instructions: As always, patient is advised to bring in medication bottles in order to correctly reconcile with our current list.    Laurie Manning received counseling on the following healthy behaviors: n/a    Patient given educational materials on dx    I have instructed Laurie Manning to complete a self tracking handout on n/a and instructed them to bring it with them to his next appointment. Discussed use, benefit, and side effects of prescribed medications. Barriers to medication compliance addressed. All patient questions answered. Pt voiced understanding.      LASHONDA Small

## 2020-11-13 ENCOUNTER — OFFICE VISIT (OUTPATIENT)
Dept: PRIMARY CARE CLINIC | Age: 40
End: 2020-11-13
Payer: COMMERCIAL

## 2020-11-13 ENCOUNTER — TELEPHONE (OUTPATIENT)
Dept: PRIMARY CARE CLINIC | Age: 40
End: 2020-11-13

## 2020-11-13 VITALS
BODY MASS INDEX: 41.75 KG/M2 | HEART RATE: 61 BPM | WEIGHT: 315 LBS | HEIGHT: 73 IN | DIASTOLIC BLOOD PRESSURE: 84 MMHG | SYSTOLIC BLOOD PRESSURE: 130 MMHG | TEMPERATURE: 96.8 F | OXYGEN SATURATION: 98 %

## 2020-11-13 DIAGNOSIS — Z00.00 ENCOUNTER FOR PREVENTIVE HEALTH EXAMINATION: ICD-10-CM

## 2020-11-13 PROBLEM — E66.813 CLASS 3 SEVERE OBESITY DUE TO EXCESS CALORIES WITHOUT SERIOUS COMORBIDITY WITH BODY MASS INDEX (BMI) OF 45.0 TO 49.9 IN ADULT: Status: ACTIVE | Noted: 2020-11-13

## 2020-11-13 PROBLEM — E66.01 CLASS 3 SEVERE OBESITY DUE TO EXCESS CALORIES WITHOUT SERIOUS COMORBIDITY WITH BODY MASS INDEX (BMI) OF 45.0 TO 49.9 IN ADULT (HCC): Status: ACTIVE | Noted: 2020-11-13

## 2020-11-13 PROBLEM — Z91.09 ENVIRONMENTAL ALLERGIES: Status: ACTIVE | Noted: 2020-11-13

## 2020-11-13 PROBLEM — G47.33 OBSTRUCTIVE SLEEP APNEA SYNDROME: Status: ACTIVE | Noted: 2020-11-13

## 2020-11-13 LAB
ALBUMIN SERPL-MCNC: 4.4 G/DL (ref 3.5–5.2)
ALP BLD-CCNC: 119 U/L (ref 40–130)
ALT SERPL-CCNC: 23 U/L (ref 5–41)
ANION GAP SERPL CALCULATED.3IONS-SCNC: 9 MMOL/L (ref 7–19)
AST SERPL-CCNC: 25 U/L (ref 5–40)
BASOPHILS ABSOLUTE: 0 K/UL (ref 0–0.2)
BASOPHILS RELATIVE PERCENT: 0.8 % (ref 0–1)
BILIRUB SERPL-MCNC: 0.5 MG/DL (ref 0.2–1.2)
BUN BLDV-MCNC: 9 MG/DL (ref 6–20)
CALCIUM SERPL-MCNC: 8.8 MG/DL (ref 8.6–10)
CHLORIDE BLD-SCNC: 104 MMOL/L (ref 98–111)
CHOLESTEROL, TOTAL: 169 MG/DL (ref 160–199)
CO2: 25 MMOL/L (ref 22–29)
CREAT SERPL-MCNC: 0.7 MG/DL (ref 0.5–1.2)
EOSINOPHILS ABSOLUTE: 0.1 K/UL (ref 0–0.6)
EOSINOPHILS RELATIVE PERCENT: 1.9 % (ref 0–5)
GFR AFRICAN AMERICAN: >59
GFR NON-AFRICAN AMERICAN: >60
GLUCOSE BLD-MCNC: 86 MG/DL (ref 74–109)
HBA1C MFR BLD: 5.3 % (ref 4–6)
HCT VFR BLD CALC: 44 % (ref 42–52)
HDLC SERPL-MCNC: 58 MG/DL (ref 55–121)
HEMOGLOBIN: 13.9 G/DL (ref 14–18)
IMMATURE GRANULOCYTES #: 0 K/UL
LDL CHOLESTEROL CALCULATED: 86 MG/DL
LYMPHOCYTES ABSOLUTE: 1.3 K/UL (ref 1.1–4.5)
LYMPHOCYTES RELATIVE PERCENT: 24.5 % (ref 20–40)
MCH RBC QN AUTO: 27.7 PG (ref 27–31)
MCHC RBC AUTO-ENTMCNC: 31.6 G/DL (ref 33–37)
MCV RBC AUTO: 87.6 FL (ref 80–94)
MONOCYTES ABSOLUTE: 0.4 K/UL (ref 0–0.9)
MONOCYTES RELATIVE PERCENT: 7.7 % (ref 0–10)
NEUTROPHILS ABSOLUTE: 3.4 K/UL (ref 1.5–7.5)
NEUTROPHILS RELATIVE PERCENT: 64.7 % (ref 50–65)
PDW BLD-RTO: 13.2 % (ref 11.5–14.5)
PLATELET # BLD: 236 K/UL (ref 130–400)
PMV BLD AUTO: 10.1 FL (ref 9.4–12.4)
POTASSIUM SERPL-SCNC: 4.3 MMOL/L (ref 3.5–5)
RBC # BLD: 5.02 M/UL (ref 4.7–6.1)
SODIUM BLD-SCNC: 138 MMOL/L (ref 136–145)
T4 FREE: 1.06 NG/DL (ref 0.93–1.7)
TOTAL PROTEIN: 7.1 G/DL (ref 6.6–8.7)
TRIGL SERPL-MCNC: 127 MG/DL (ref 0–149)
TSH SERPL DL<=0.05 MIU/L-ACNC: 1.1 UIU/ML (ref 0.27–4.2)
WBC # BLD: 5.2 K/UL (ref 4.8–10.8)

## 2020-11-13 PROCEDURE — 99396 PREV VISIT EST AGE 40-64: CPT | Performed by: NURSE PRACTITIONER

## 2020-11-13 RX ORDER — LIRAGLUTIDE 6 MG/ML
2.4 INJECTION, SOLUTION SUBCUTANEOUS DAILY
Qty: 6 ML | Refills: 5 | Status: SHIPPED | OUTPATIENT
Start: 2020-11-13 | End: 2022-01-20

## 2020-11-13 ASSESSMENT — ENCOUNTER SYMPTOMS
RHINORRHEA: 0
VOMITING: 0
DIARRHEA: 0
CONSTIPATION: 0
SHORTNESS OF BREATH: 0
SORE THROAT: 0
EYE REDNESS: 0
COUGH: 0
ABDOMINAL PAIN: 0

## 2020-11-13 NOTE — PROGRESS NOTES
[facility] [address] [address]  Phone [phone]   Fax [phone]      OFFICE VISIT: 2020    Natalieumm Olverarakesh- : 1980    Chief Amando Escobar is a 36 y.o. male who is here for Annual Exam     HPI  The patient presents today for physical.    Eyes: Yes  Dentist:  Yes  Skin:  Hand dermatitis improved  Labs:  Due, today  PSA: n/a  Nocturia:  \"Sometimes\"  Stream: Steady  Colonoscopy:  Denies blood in stool  Denies family hx of colon cancer  Exercise: gym 4-5 days a week  Diet and Nut:   Portion control  Decreasing carbs  MVI:  No  Supplements: vitamin C and Elder berry  Asa: No  Depression:  \"I am great\"  Body Image: Overweight  Fall:  No  EOL:  Yes  Tobacco: No   Substance: Socially  Immunization:  Recommend flu shot  Sleep: Wearing CPAP  Sleeping better  Averaging 2-3 hours a night before \"I fling it off. \"  \"I take it off and I don't know it. \"  \"I am wearing it but I still don't like it. \"  \"I wake up and I feel rested. \"  Cognition: No concerns     height is 6' 1\" (1.854 m) and weight is 350 lb 8 oz (159 kg) (abnormal). His temporal temperature is 96.8 °F (36 °C). His blood pressure is 130/84 and his pulse is 61. His oxygen saturation is 98%. Body mass index is 46.24 kg/m².     Results for orders placed or performed in visit on 19   TSH without Reflex   Result Value Ref Range    TSH 1.590 0.270 - 4.200 uIU/mL   T4, Free   Result Value Ref Range    T4 Free 1.1 0.9 - 1.7 ng/dL   Microalbumin / Creatinine Urine Ratio   Result Value Ref Range    Microalbumin, Random Urine <1.20 0.00 - 19.00 mg/dL    Creatinine, Ur 292.7 4.2 - 622.0 mg/dL    Microalbumin Creatinine Ratio see below mg/g   Lipid Panel   Result Value Ref Range    Cholesterol, Total 181 160 - 199 mg/dL    Triglycerides 130 0 - 149 mg/dL    HDL 54 (L) 55 - 121 mg/dL    LDL Calculated 101 <100 mg/dL   Comprehensive Metabolic Panel   Result Value Ref Range    Sodium 141 136 - 145 mmol/L    Potassium 4.1 3.5 - 5.0 mmol/L Chloride 102 98 - 111 mmol/L    CO2 24 22 - 29 mmol/L    Anion Gap 15 7 - 19 mmol/L    Glucose 51 (L) 74 - 109 mg/dL    BUN 11 6 - 20 mg/dL    CREATININE 0.7 0.5 - 1.2 mg/dL    GFR Non-African American >60 >60    Calcium 8.9 8.6 - 10.0 mg/dL    Total Protein 7.2 6.6 - 8.7 g/dL    Alb 4.1 3.5 - 5.2 g/dL    Total Bilirubin 0.5 0.2 - 1.2 mg/dL    Alkaline Phosphatase 114 40 - 130 U/L    ALT 20 5 - 41 U/L    AST 20 5 - 40 U/L   CBC Auto Differential   Result Value Ref Range    WBC 4.7 (L) 4.8 - 10.8 K/uL    RBC 4.94 4.70 - 6.10 M/uL    Hemoglobin 14.3 14.0 - 18.0 g/dL    Hematocrit 44.6 42.0 - 52.0 %    MCV 90.3 80.0 - 94.0 fL    MCH 28.9 27.0 - 31.0 pg    MCHC 32.1 (L) 33.0 - 37.0 g/dL    RDW 13.0 11.5 - 14.5 %    Platelets 556 154 - 463 K/uL    MPV 10.4 9.4 - 12.4 fL    Neutrophils % 61.8 50.0 - 65.0 %    Lymphocytes % 24.8 20.0 - 40.0 %    Monocytes % 7.9 0.0 - 10.0 %    Eosinophils % 4.5 0.0 - 5.0 %    Basophils % 0.6 0.0 - 1.0 %    Neutrophils Absolute 2.9 1.5 - 7.5 K/uL    Immature Granulocytes # 0.0 K/uL    Lymphocytes Absolute 1.2 1.1 - 4.5 K/uL    Monocytes Absolute 0.40 0.00 - 0.90 K/uL    Eosinophils Absolute 0.20 0.00 - 0.60 K/uL    Basophils Absolute 0.00 0.00 - 0.20 K/uL     have reviewed the following with the Mr. Christensen Link   Lab Review   No visits with results within 6 Month(s) from this visit.    Latest known visit with results is:   Orders Only on 11/06/2019   Component Date Value    TSH 11/06/2019 1.590     T4 Free 11/06/2019 1.1     Microalbumin, Random Uri* 11/06/2019 <1.20     Creatinine, Ur 11/06/2019 292.7     Microalbumin Creatinine * 11/06/2019 see below     Cholesterol, Total 11/06/2019 181     Triglycerides 11/06/2019 130     HDL 11/06/2019 54*    LDL Calculated 11/06/2019 101     Sodium 11/06/2019 141     Potassium 11/06/2019 4.1     Chloride 11/06/2019 102     CO2 11/06/2019 24     Anion Gap 11/06/2019 15     Glucose 11/06/2019 51*    BUN 11/06/2019 11     CREATININE 11/06/2019 0.7     GFR Non- 11/06/2019 >60     Calcium 11/06/2019 8.9     Total Protein 11/06/2019 7.2     Alb 11/06/2019 4.1     Total Bilirubin 11/06/2019 0.5     Alkaline Phosphatase 11/06/2019 114     ALT 11/06/2019 20     AST 11/06/2019 20     WBC 11/06/2019 4.7*    RBC 11/06/2019 4.94     Hemoglobin 11/06/2019 14.3     Hematocrit 11/06/2019 44.6     MCV 11/06/2019 90.3     MCH 11/06/2019 28.9     MCHC 11/06/2019 32.1*    RDW 11/06/2019 13.0     Platelets 95/97/7620 229     MPV 11/06/2019 10.4     Neutrophils % 11/06/2019 61.8     Lymphocytes % 11/06/2019 24.8     Monocytes % 11/06/2019 7.9     Eosinophils % 11/06/2019 4.5     Basophils % 11/06/2019 0.6     Neutrophils Absolute 11/06/2019 2.9     Immature Granulocytes # 11/06/2019 0.0     Lymphocytes Absolute 11/06/2019 1.2     Monocytes Absolute 11/06/2019 0.40     Eosinophils Absolute 11/06/2019 0.20     Basophils Absolute 11/06/2019 0.00      Copies of these are in the chart. Prior to Visit Medications    Medication Sig Taking?  Authorizing Provider   hydrOXYzine (ATARAX) 50 MG tablet Take 1 tablet by mouth every 6 hours as needed for Itching or Anxiety Yes LASHONDA Arrieta   epinastine (ELESTAT) 0.05 % SOLN Place 1 drop into both eyes 2 times daily Yes LASHONDA Arrieta   cetirizine (ZYRTEC) 10 MG tablet Take 1 tablet by mouth daily Yes LASHONDA Tyler   montelukast (SINGULAIR) 10 MG tablet Take 1 tablet by mouth nightly Yes LASHONDA Arrieta   liraglutide-weight management (SAXENDA) 18 MG/3ML SOPN Inject 1.2 mg as directed daily Yes LASHONDA Arrieta   mupirocin (BACTROBAN) 2 % ointment APPLY TO AFFECTED AREA THREE TIMES DAILY Yes Aisha Kanner, APRN   B-D ULTRAFINE III SHORT PEN 31G X 8 MM MISC USE SAXENDA ONCE DAILY Yes LASHONDA Tyler   triamcinolone (KENALOG) 0.1 % ointment  Yes Historical Provider, MD   pantoprazole (PROTONIX) 40 MG tablet Take 1 tablet by mouth 2 times daily Yes LASHONDA Arrieta   fluticasone (FLONASE) 50 MCG/ACT nasal spray INSTILL 2 SPRAYS IN EACH NOSTRIL DAILY Yes LASHONDA Trujillo   nystatin (MYCOSTATIN) 877092 UNIT/GM powder Apply topically 3 times daily Apply 3 times daily. Yes LASHONDA Arrieta   EPINEPHrine (EPIPEN 2-KEIKO) 0.3 MG/0.3ML SOAJ injection Inject 0.3 mLs into the skin once for 1 dose Use as directed for allergic reaction Yes LASHONDA Arrieta       Allergies: Cat hair extract; Dog epithelium; Penicillins; and Meat extract    Past Medical History:   Diagnosis Date    Hypertension        Past Surgical History:   Procedure Laterality Date    GASTRIC BYPASS SURGERY         Social History     Tobacco Use    Smoking status: Never Smoker    Smokeless tobacco: Never Used   Substance Use Topics    Alcohol use: Yes     Comment: rarely       No family history on file. Review of Systems   Constitutional: Negative for chills, fatigue and fever. HENT: Negative for congestion, ear pain, rhinorrhea and sore throat. Eyes: Negative for redness. Respiratory: Negative for cough and shortness of breath. Cardiovascular: Negative for chest pain. Gastrointestinal: Negative for abdominal pain, constipation, diarrhea and vomiting. Genitourinary: Negative for dysuria. Musculoskeletal: Negative for gait problem and joint swelling. Skin: Negative for rash. Neurological: Negative for dizziness and headaches. Psychiatric/Behavioral: Positive for sleep disturbance (improved). The patient is nervous/anxious (improved). Physical Exam  Vitals signs reviewed. Constitutional:       Appearance: He is well-developed. He is obese. HENT:      Head: Normocephalic. Right Ear: Tympanic membrane and external ear normal.      Left Ear: Tympanic membrane and external ear normal.      Nose: Nose normal.      Mouth/Throat:      Mouth: Mucous membranes are moist.   Eyes:      General:         Right eye: No discharge.          Left eye: No discharge. Neck:      Musculoskeletal: Normal range of motion. Vascular: No carotid bruit. Cardiovascular:      Rate and Rhythm: Normal rate and regular rhythm. Pulmonary:      Effort: Pulmonary effort is normal.      Breath sounds: Normal breath sounds. No wheezing, rhonchi or rales. Abdominal:      General: Bowel sounds are normal. There is no distension. Palpations: Abdomen is soft. Tenderness: There is no abdominal tenderness. Musculoskeletal: Normal range of motion. Lymphadenopathy:      Cervical: No cervical adenopathy. Skin:     General: Skin is dry. Neurological:      General: No focal deficit present. Mental Status: He is alert and oriented to person, place, and time. Mental status is at baseline. Psychiatric:         Mood and Affect: Mood normal.         Behavior: Behavior normal.         Thought Content: Thought content normal.         Judgment: Judgment normal.       ASSESSMENT      ICD-10-CM    1. Encounter for preventive health examination  Z00.00 Labs today   2. Obstructive sleep apnea syndrome  G47.33 Continue CPAP   3. Primary insomnia  F51.01 Continue CPAP   4. Class 3 severe obesity due to excess calories without serious comorbidity with body mass index (BMI) of 45.0 to 49.9 in adult [facility])  E66.01 Recommend diet and exercise    Z68.42    5. Environmental allergies  Z91.09 The current medical regimen is effective;  continue present plan and medications. PLAN    No orders of the defined types were placed in this encounter. Return in about 3 months (around 2/13/2021), or if symptoms worsen or fail to improve. Patient Instructions     Patient Education        Learning About CPAP for Sleep Apnea  What is CPAP? CPAP is a small machine that you use at home every night while you sleep. It increases air pressure in your throat to keep your airway open. When you have sleep apnea, this can help you sleep better so you feel much better. CPAP stands for \"continuous positive airway pressure. \"  The CPAP machine will have one of the following:  · A mask that covers your nose and mouth  · Prongs that fit into your nose  · A mask that covers your nose only, which is the most common type. This type is called NCPAP. The N stands for \"nasal.\"  Why is it done? CPAP is usually the best treatment for obstructive sleep apnea. It is the first treatment choice and the most widely used. CPAP:  · Helps you have more normal sleep, so you feel less sleepy and more alert during the daytime. · May help keep heart failure or other heart problems from getting worse. · May help lower your blood pressure. If you use CPAP, your bed partner may also sleep better. That's because you aren't snoring or restless. Your doctor may suggest CPAP if you have:  · Moderate to severe sleep apnea. · Sleep apnea and coronary artery disease (CAD). · Sleep apnea and heart failure. What are the side effects? Some people who use CPAP have:  · A dry or stuffy nose and a sore throat. · Irritated skin on the face. · Sore eyes. · Bloating. How can you care for yourself? If using CPAP is not comfortable, or if you have certain side effects, work with your doctor to fix them. Here are some things you can try:  · Be sure the mask or nasal prongs fit well. · See if your doctor can adjust the pressure of your CPAP. · If your nose is dry, try a humidifier. · If your nose is runny or stuffy, try decongestant medicine or a steroid nasal spray. Be safe with medicines. Read and follow all instructions on the label. Do not use the medicine longer than the label says. If these things don't help, you might try a different type of machine. Some machines have air pressure that adjusts on its own. Others have air pressures that are different when you breathe in than when you breathe out. This may reduce discomfort caused by too much pressure in your nose. Where can you learn more?   Go to [email or web address] and sign in to your [employer] account. Enter P311 in the [facility] box to learn more about \"Learning About CPAP for Sleep Apnea. \"     If you do not have an account, please click on the \"Sign Up Now\" link. Current as of: February 24, 2020               Content Version: 12.6  © 2803-8726 [employer], [employer]. Care instructions adapted under license by [facility] ([facility]). If you have questions about a medical condition or this instruction, always ask your healthcare professional. [place] [address] any warranty or liability for your use of this information. Controlled Substances Monitoring:  n/a            Additional Instructions: As always, patient is advised to bring in medication bottles in order to correctly reconcile with our current list.    Janine Reddy received counseling on the following healthy behaviors: n/a    Patient given educational materials on dx    I have instructed Janine Reddy to complete a self tracking handout on n/a and instructed them to bring it with them to his next appointment. Discussed use, benefit, and side effects of prescribed medications. Barriers to medication compliance addressed. All patient questions answered. Pt voiced understanding.      LASHONDA Marley

## 2020-11-13 NOTE — TELEPHONE ENCOUNTER
----- Message from LASHONDA Lua sent at 11/13/2020 12:57 PM CST -----  Thyroid: WNL  A1c: WNL, no evidence of diabetes  Cholesterol is well controlled  CMP: WNL, normal electrolyes, kidney and liver fxn  CBC: WNL, no evidence of infection or anemia

## 2020-11-13 NOTE — PATIENT INSTRUCTIONS
Patient Education        Learning About CPAP for Sleep Apnea  What is CPAP? CPAP is a small machine that you use at home every night while you sleep. It increases air pressure in your throat to keep your airway open. When you have sleep apnea, this can help you sleep better so you feel much better. CPAP stands for \"continuous positive airway pressure. \"  The CPAP machine will have one of the following:  · A mask that covers your nose and mouth  · Prongs that fit into your nose  · A mask that covers your nose only, which is the most common type. This type is called NCPAP. The N stands for \"nasal.\"  Why is it done? CPAP is usually the best treatment for obstructive sleep apnea. It is the first treatment choice and the most widely used. CPAP:  · Helps you have more normal sleep, so you feel less sleepy and more alert during the daytime. · May help keep heart failure or other heart problems from getting worse. · May help lower your blood pressure. If you use CPAP, your bed partner may also sleep better. That's because you aren't snoring or restless. Your doctor may suggest CPAP if you have:  · Moderate to severe sleep apnea. · Sleep apnea and coronary artery disease (CAD). · Sleep apnea and heart failure. What are the side effects? Some people who use CPAP have:  · A dry or stuffy nose and a sore throat. · Irritated skin on the face. · Sore eyes. · Bloating. How can you care for yourself? If using CPAP is not comfortable, or if you have certain side effects, work with your doctor to fix them. Here are some things you can try:  · Be sure the mask or nasal prongs fit well. · See if your doctor can adjust the pressure of your CPAP. · If your nose is dry, try a humidifier. · If your nose is runny or stuffy, try decongestant medicine or a steroid nasal spray. Be safe with medicines. Read and follow all instructions on the label. Do not use the medicine longer than the label says.   If these things don't help, you might try a different type of machine. Some machines have air pressure that adjusts on its own. Others have air pressures that are different when you breathe in than when you breathe out. This may reduce discomfort caused by too much pressure in your nose. Where can you learn more? Go to [email or web address]. org and sign in to your [employer] account. Enter V207 in the iStoryTime box to learn more about \"Learning About CPAP for Sleep Apnea. \"     If you do not have an account, please click on the \"Sign Up Now\" link. Current as of: February 24, 2020               Content Version: 12.6  © [id number] [employer]. Care instructions adapted under license by [facility] ([facility]). If you have questions about a medical condition or this instruction, always ask your healthcare professional. [address] any warranty or liability for your use of this information.

## 2020-11-13 NOTE — TELEPHONE ENCOUNTER
Called patient, spoke with: Patient regarding the results of the patients most recent labs. I advised Patient of Alejandra Atkinson recommendations.    Patient did voice understanding

## 2020-11-23 NOTE — TELEPHONE ENCOUNTER
Received fax from pharmacy requesting refill on pts medication(s). Pt was last seen in office on 11/13/2020  and has a follow up scheduled for 1/26/2021. Will send request to  [facility]  for authorization. Requested Prescriptions     Pending Prescriptions Disp Refills    epinastine (ELESTAT) 0.05 % SOLN [Pharmacy Med Name: EPINASTINE HCL 0.05 % SOLN 0.05 SOLN] 5 mL 1     Sig: PLACE ONE DROP INTO BOTH EYES TWICE A DAY.

## 2020-11-24 RX ORDER — EPINASTINE HCL 0.05 %
DROPS OPHTHALMIC (EYE)
Qty: 5 ML | Refills: 1 | Status: SHIPPED | OUTPATIENT
Start: 2020-11-24

## 2020-12-08 ENCOUNTER — OFFICE VISIT (OUTPATIENT)
Dept: NEUROLOGY | Age: 40
End: 2020-12-08
Payer: COMMERCIAL

## 2020-12-08 VITALS
SYSTOLIC BLOOD PRESSURE: 137 MMHG | RESPIRATION RATE: 20 BRPM | DIASTOLIC BLOOD PRESSURE: 81 MMHG | HEIGHT: 73 IN | BODY MASS INDEX: 41.75 KG/M2 | WEIGHT: 315 LBS | HEART RATE: 75 BPM

## 2020-12-08 DIAGNOSIS — G47.33 SLEEP APNEA, OBSTRUCTIVE: Primary | ICD-10-CM

## 2020-12-08 PROCEDURE — 99213 OFFICE O/P EST LOW 20 MIN: CPT | Performed by: PSYCHIATRY & NEUROLOGY

## 2020-12-08 NOTE — PROGRESS NOTES
[facility] Neurology and Sleep  [address] [place] [address] Drive, [address] [address],[address], [place] Sanchez  Phone [phone]  [phone]     [facility] SLEEP NEW PATIENT VISIT        Patient: Mandy Patel  :  1980  Age:  36 y.o. MRN:  [id number]  Account #:  [de-identified]  Today:  20    Referring Provider:  LASHONDA Jay  Consulting Provider: KIMI Avila:  Chief Complaint   Patient presents with    New Patient    Sleep Apnea       History Source: History obtained from chart review and the patient. PCP: LASHONDA Carey    HISTORY OF PRESENT ILLNESS:  Mandy Patel is a 36y.o. year old man who was referred for a sleep evaluation. He complained of snoring and fatigue. He had been witnessed to stop breathing during his sleep. He had a home sleep test that showed obstructive sleep apnea with an AHI of 20.5 with low sat of 82% and 15.3 and low sat of 84%. He was set up with an auto CPAP at 8cm to 20cm. He has not tolerated that. He complains of the pressure being too high and causing his mask to leak. He has cannot breath out against the pressure. It is difficulty to get to sleep with the CPAP. He continues to have poor sleep and daytime fatigue. PAST MEDICAL HITORY:    Past Medical History:   Diagnosis Date    Hypertension        Past Surgical History:   Procedure Laterality Date    GASTRIC BYPASS SURGERY         Recent Hospitalizations  · None    Significant Injuries  · None    Habits  Mandy Patel reports that he has never smoked. He has never used smokeless tobacco. He reports current alcohol use. He reports that he does not use drugs. Current Outpatient Medications   Medication Sig Dispense Refill    epinastine (ELESTAT) 0.05 % SOLN PLACE ONE DROP INTO BOTH EYES TWICE A DAY.  5 mL 1    liraglutide-weight management (SAXENDA) 18 MG/3ML SOPN Inject 2.4 mg as directed daily 6 mL 5  hydrOXYzine (ATARAX) 50 MG tablet Take 1 tablet by mouth every 6 hours as needed for Itching or Anxiety 60 tablet 5    cetirizine (ZYRTEC) 10 MG tablet Take 1 tablet by mouth daily 30 tablet 11    montelukast (SINGULAIR) 10 MG tablet Take 1 tablet by mouth nightly 30 tablet 11    mupirocin (BACTROBAN) 2 % ointment APPLY TO AFFECTED AREA THREE TIMES DAILY 22 g 1    B-D ULTRAFINE III SHORT PEN 31G X 8 MM MISC USE SAXENDA ONCE DAILY 100 each 1    triamcinolone (KENALOG) 0.1 % ointment       pantoprazole (PROTONIX) 40 MG tablet Take 1 tablet by mouth 2 times daily 60 tablet 11    fluticasone (FLONASE) 50 MCG/ACT nasal spray INSTILL 2 SPRAYS IN EACH NOSTRIL DAILY 16 g 5    nystatin (MYCOSTATIN) 305207 UNIT/GM powder Apply topically 3 times daily Apply 3 times daily. 1 Bottle 3    EPINEPHrine (EPIPEN 2-KEIKO) 0.3 MG/0.3ML SOAJ injection Inject 0.3 mLs into the skin once for 1 dose Use as directed for allergic reaction 0.3 mL 3     No current facility-administered medications for this visit. Allergies as of 12/08/2020 - Review Complete 12/08/2020   Allergen Reaction Noted    Cat hair extract Anaphylaxis 08/08/2017    Dog epithelium Anaphylaxis 08/08/2017    Penicillins Anaphylaxis 05/06/2014    Meat extract  01/02/2019       FAMILY HISTORY:   History reviewed. No pertinent family history. SOCIAL HISTORY:   Aj Skaggs is single, lives in [place], [place], and works for [employer]. REVIEW OF SYSTEMS:  Review of Systems   Constitutional: Positive for fatigue. Negative for chills and fever. HENT: Negative for hearing loss and tinnitus. Eyes: Negative for photophobia and visual disturbance. Respiratory: Positive for apnea. Negative for cough and shortness of breath. Cardiovascular: Negative for chest pain and palpitations. Gastrointestinal: Negative for nausea and vomiting. Endocrine: Negative for polydipsia and polyuria. Genitourinary: Negative for frequency and urgency. VII Facial movements are symmetrical without weakness   VIII Hearing is intact   XII No tongue atrophy or fasciculations. Normal tongue protrusion. No tongue weakness  Motor:  Normal strength in both upper and lower extremities. Normal muscle tone and bulk. Deep tendon reflexes are intact and symmetrical in both upper and lower extremities. Jarrett's signs are absent bilaterally. There is no ankle clonus on either side. Sensation:  Sensation is intact to light touch and vibration in all extremities. Coordination:  Rapid alternating movements are normal in both upper and lower extremities. Finger to nose testing is unimpaired bilaterally. Gait:  Normal station and gait. ADDITIONAL REVIEW:       CPAP download shows that he is not compliant. IMPRESSION:    ICD-10-CM    1. Sleep apnea, obstructive  G47.33      I discussed the diagnosis of obstructive sleep apnea with Richa Louis including thepathophysiology (namely the mechanism of breathing and obstruction of upper airway, interruptions of sleep, hypoxemia, hypercapnia, and results of repetitive sympathetic activation), risks, evaluation, and treatment options. I discussed the risks of driving when drowsy and advised that Richa Louis not drive when drowsy and avoid sedatingmedications and respiratory suppressants. He has not been able to tolerate his auto adjusting CPAP and he has had the device 4 months. PLAN:  1. Titration study and re trial of appropriate PAP device.   2. FU per protocol    Jaiden Dinero M.D.

## 2021-01-12 ASSESSMENT — ENCOUNTER SYMPTOMS
NAUSEA: 0
COUGH: 0
VOMITING: 0
APNEA: 1
SHORTNESS OF BREATH: 0
PHOTOPHOBIA: 0
BACK PAIN: 0

## 2021-01-26 ENCOUNTER — HOSPITAL ENCOUNTER (OUTPATIENT)
Dept: SLEEP CENTER | Age: 41
Discharge: HOME | End: 2021-01-28
Payer: COMMERCIAL

## 2021-01-26 ENCOUNTER — OFFICE VISIT (OUTPATIENT)
Dept: PRIMARY CARE CLINIC | Age: 41
End: 2021-01-26
Payer: COMMERCIAL

## 2021-01-26 VITALS
BODY MASS INDEX: 41.75 KG/M2 | HEIGHT: 73 IN | SYSTOLIC BLOOD PRESSURE: 130 MMHG | TEMPERATURE: 97 F | DIASTOLIC BLOOD PRESSURE: 80 MMHG | WEIGHT: 315 LBS | HEART RATE: 82 BPM | OXYGEN SATURATION: 98 %

## 2021-01-26 DIAGNOSIS — Z91.09 ENVIRONMENTAL ALLERGIES: ICD-10-CM

## 2021-01-26 DIAGNOSIS — G47.33 OBSTRUCTIVE SLEEP APNEA SYNDROME: Primary | ICD-10-CM

## 2021-01-26 DIAGNOSIS — R09.81 NASAL CONGESTION: ICD-10-CM

## 2021-01-26 DIAGNOSIS — K21.9 GASTROESOPHAGEAL REFLUX DISEASE, UNSPECIFIED WHETHER ESOPHAGITIS PRESENT: ICD-10-CM

## 2021-01-26 DIAGNOSIS — E66.01 CLASS 3 SEVERE OBESITY DUE TO EXCESS CALORIES WITHOUT SERIOUS COMORBIDITY WITH BODY MASS INDEX (BMI) OF 45.0 TO 49.9 IN ADULT (HCC): ICD-10-CM

## 2021-01-26 PROCEDURE — 99214 OFFICE O/P EST MOD 30 MIN: CPT | Performed by: NURSE PRACTITIONER

## 2021-01-26 PROCEDURE — 95811 POLYSOM 6/>YRS CPAP 4/> PARM: CPT

## 2021-01-26 RX ORDER — OLOPATADINE HYDROCHLORIDE 665 UG/1
2 SPRAY NASAL 2 TIMES DAILY
Qty: 1 BOTTLE | Refills: 5 | Status: SHIPPED | OUTPATIENT
Start: 2021-01-26 | End: 2021-06-08

## 2021-01-26 ASSESSMENT — ENCOUNTER SYMPTOMS
RHINORRHEA: 1
COUGH: 0
NAUSEA: 0
SHORTNESS OF BREATH: 0
ABDOMINAL PAIN: 0
VOMITING: 0
EYE REDNESS: 0
BACK PAIN: 0
SORE THROAT: 0
DIARRHEA: 0
EYE PAIN: 0

## 2021-01-26 NOTE — PROGRESS NOTES
Cait Coleman (:  1980) is a 36 y.o. male,Established patient, here for evaluation of the following chief complaint(s):  6 Month Follow-Up      ASSESSMENT/PLAN:  1. Obstructive sleep apnea syndrome--repeat titration sleep study tonight  2. Environmental allergies  -     olopatadine (PATANASE) 0.6 % SOLN nassl soln; 2 sprays by Nasal route 2 times daily, Disp-1 Bottle, R-5Normal  - Continue Zyrtec  - Continue Singulair  - Continue Flonase  3. Class 3 severe obesity due to excess calories without serious comorbidity with body mass index (BMI) of 45.0 to 49.9 in adult (HCC)--continue Saxenda, continue diet and exercise  4. Nasal congestion  -     olopatadine (PATANASE) 0.6 % SOLN nassl soln; 2 sprays by Nasal route 2 times daily, Disp-1 Bottle, R-5Normal  - Continue Flonase  5. Gastroesophageal reflux disease, unspecified whether esophagitis present--continue Protonix 40 mg BID. Return in about 6 months (around 2021), or if symptoms worsen or fail to improve. SUBJECTIVE/OBJECTIVE:  HPI     SLEEP APNEA:  He has a repeat titration study tonight. \"I could not tolerate the mask I had. \"    WEIGHT LOSS:  \"I am doing a weight regimen. \"  He continues on Saxenda. He is taking 2.4 mg daily  Denies nausea. Clothes are fitting better. \"I am feeling better. \"  \"I am doing protein shakes in the morning. \"  \"I am eating a lot of veggies. \"  \"I don't get hungry. \"    ALLERGIES:  \"Terrible. \"  \"The weather is so up and down. \"  She continues on Zyrtec, Singulair and Flonase. GERD:  Well controlled on Protonix BID. Review of Systems   Constitutional: Positive for appetite change (decreased). Negative for fatigue and fever. HENT: Positive for congestion, postnasal drip and rhinorrhea. Negative for ear pain and sore throat. Eyes: Negative for pain and redness. Respiratory: Negative for cough and shortness of breath. Gastrointestinal: Negative for abdominal pain, diarrhea, nausea and vomiting. GERD: well controlled on current regimen   Genitourinary: Negative for dysuria. Musculoskeletal: Negative for arthralgias and back pain. Psychiatric/Behavioral: Positive for sleep disturbance. The patient is not nervous/anxious. Physical Exam  Vitals signs reviewed. Constitutional:       Appearance: He is well-developed. He is obese. HENT:      Head: Normocephalic. Right Ear: Tympanic membrane, ear canal and external ear normal.      Left Ear: Tympanic membrane, ear canal and external ear normal.      Nose: Congestion present. Eyes:      General:         Right eye: No discharge. Left eye: No discharge. Neck:      Musculoskeletal: Normal range of motion. Cardiovascular:      Rate and Rhythm: Normal rate and regular rhythm. Pulmonary:      Effort: Pulmonary effort is normal.      Breath sounds: Normal breath sounds. No wheezing, rhonchi or rales. Abdominal:      General: Bowel sounds are normal.      Palpations: Abdomen is soft. Lymphadenopathy:      Cervical: No cervical adenopathy. Skin:     General: Skin is dry. Neurological:      General: No focal deficit present. Mental Status: He is alert and oriented to person, place, and time. Mental status is at baseline. Psychiatric:         Mood and Affect: Mood normal.         Behavior: Behavior normal.         Thought Content: Thought content normal.         Judgment: Judgment normal.             An electronic signature was used to authenticate this note.     --LASHONDA Barahona

## 2021-02-24 ENCOUNTER — PATIENT MESSAGE (OUTPATIENT)
Dept: PRIMARY CARE CLINIC | Age: 41
End: 2021-02-24

## 2021-02-24 RX ORDER — PREDNISONE 10 MG/1
10 TABLET ORAL DAILY
Qty: 43 TABLET | Refills: 0 | OUTPATIENT
Start: 2021-02-24 | End: 2021-04-08

## 2021-02-24 NOTE — TELEPHONE ENCOUNTER
From: Chauncey Meadows  To: LASHONDA Persaud  Sent: 2/24/2021 1:03 PM CST  Subject: Non-Urgent Medical Question    Marlyn Hansen, my left hand seems to breaking out i hives. I my right finger also has that broke out and may get infected. you have seen me for itnin the past. Can I get some predinsoine to help with it?

## 2021-02-25 DIAGNOSIS — L03.011 CELLULITIS OF FINGER OF RIGHT HAND: ICD-10-CM

## 2021-02-25 RX ORDER — PREDNISONE 10 MG/1
10 TABLET ORAL DAILY
Qty: 43 TABLET | Refills: 0 | Status: SHIPPED | OUTPATIENT
Start: 2021-02-25 | End: 2021-03-07

## 2021-02-25 NOTE — TELEPHONE ENCOUNTER
Received fax from pharmacy requesting refill on pts medication(s). Pt was last seen in office on 1/26/2021  and has a follow up scheduled for 7/27/2021. Will send request to  [facility]  for authorization.      Requested Prescriptions     Pending Prescriptions Disp Refills    mupirocin (BACTROBAN) 2 % ointment [Pharmacy Med Name: MUPIROCIN 2 % OINT 2 Ointment] 22 g 1     Sig: APPLY TO AFFECTED AREA THREE TIMES DAILY

## 2021-03-13 DIAGNOSIS — G47.33 SLEEP APNEA, OBSTRUCTIVE: Primary | ICD-10-CM

## 2021-03-13 PROCEDURE — 95811 POLYSOM 6/>YRS CPAP 4/> PARM: CPT | Performed by: PSYCHIATRY & NEUROLOGY

## 2021-03-24 DIAGNOSIS — J01.00 ACUTE NON-RECURRENT MAXILLARY SINUSITIS: ICD-10-CM

## 2021-03-24 DIAGNOSIS — Z91.09 ENVIRONMENTAL ALLERGIES: ICD-10-CM

## 2021-03-24 RX ORDER — FLUTICASONE PROPIONATE 50 MCG
2 SPRAY, SUSPENSION (ML) NASAL DAILY
Qty: 3 BOTTLE | Refills: 3 | Status: SHIPPED | OUTPATIENT
Start: 2021-03-24

## 2021-03-24 NOTE — TELEPHONE ENCOUNTER
Received fax from pharmacy requesting refill on pts medication(s). Pt was last seen in office on 1/26/2021  and has a follow up scheduled for 7/27/2021. Will send request to  [facility]  for patient.      Requested Prescriptions     Pending Prescriptions Disp Refills    fluticasone (FLONASE) 50 MCG/ACT nasal spray [Pharmacy Med Name: FLUTICASONE PROP 50 MCG SPR 50 VITALIY] 16 g 5     Sig: INSTILL 2 SPRAYS IN EACH NOSTRIL DAILY

## 2021-04-23 DIAGNOSIS — E66.01 CLASS 3 SEVERE OBESITY DUE TO EXCESS CALORIES WITHOUT SERIOUS COMORBIDITY WITH BODY MASS INDEX (BMI) OF 45.0 TO 49.9 IN ADULT (HCC): Primary | ICD-10-CM

## 2021-04-26 RX ORDER — PEN NEEDLE, DIABETIC 31 GX5/16"
NEEDLE, DISPOSABLE MISCELLANEOUS
Qty: 100 EACH | Refills: 1 | Status: SHIPPED | OUTPATIENT
Start: 2021-04-26 | End: 2022-03-23

## 2021-05-19 DIAGNOSIS — T78.40XA RASH DUE TO ALLERGY: ICD-10-CM

## 2021-05-19 DIAGNOSIS — R21 RASH DUE TO ALLERGY: ICD-10-CM

## 2021-05-19 DIAGNOSIS — Z91.09 ENVIRONMENTAL ALLERGIES: ICD-10-CM

## 2021-05-19 DIAGNOSIS — K21.9 GASTROESOPHAGEAL REFLUX DISEASE: ICD-10-CM

## 2021-05-20 RX ORDER — HYDROXYZINE 50 MG/1
50 TABLET, FILM COATED ORAL EVERY 6 HOURS PRN
Qty: 60 TABLET | Refills: 5 | Status: SHIPPED | OUTPATIENT
Start: 2021-05-20 | End: 2021-11-23

## 2021-05-20 RX ORDER — PANTOPRAZOLE SODIUM 40 MG/1
40 TABLET, DELAYED RELEASE ORAL 2 TIMES DAILY
Qty: 60 TABLET | Refills: 11 | Status: SHIPPED | OUTPATIENT
Start: 2021-05-20 | End: 2022-07-07

## 2021-05-20 NOTE — TELEPHONE ENCOUNTER
Received fax from pharmacy requesting refill on pts medication(s). Pt was last seen in office on 1/26/2021  and has a follow up scheduled for 7/27/2021. Will send request to  [facility]  for patient. Requested Prescriptions     Pending Prescriptions Disp Refills    hydrOXYzine (ATARAX) 50 MG tablet [Pharmacy Med Name: HYDROXYZINE HCL 50 MG TABS 50 Tablet] 60 tablet 5     Sig: TAKE ONE TABLET BY MOUTH EVERY SIX HOURS AS NEEDED FOR ITCHING OR ANXIETY.     pantoprazole (PROTONIX) 40 MG tablet [Pharmacy Med Name: PANTOPRAZOLE SOD DR 40 MG T 40 Tablet] 60 tablet 11     Sig: TAKE ONE TABLET BY MOUTH TWICE A DAY

## 2021-05-27 RX ORDER — VALACYCLOVIR HYDROCHLORIDE 1 G/1
1000 TABLET, FILM COATED ORAL 3 TIMES DAILY
Qty: 21 TABLET | Refills: 0 | Status: SHIPPED | OUTPATIENT
Start: 2021-05-27 | End: 2021-06-03

## 2021-06-08 ENCOUNTER — OFFICE VISIT (OUTPATIENT)
Dept: NEUROLOGY | Age: 41
End: 2021-06-08
Payer: COMMERCIAL

## 2021-06-08 VITALS
WEIGHT: 315 LBS | HEART RATE: 74 BPM | RESPIRATION RATE: 18 BRPM | SYSTOLIC BLOOD PRESSURE: 125 MMHG | BODY MASS INDEX: 41.75 KG/M2 | HEIGHT: 73 IN | DIASTOLIC BLOOD PRESSURE: 81 MMHG

## 2021-06-08 DIAGNOSIS — G47.33 SLEEP APNEA, OBSTRUCTIVE: Primary | ICD-10-CM

## 2021-06-08 PROCEDURE — 99213 OFFICE O/P EST LOW 20 MIN: CPT | Performed by: PSYCHIATRY & NEUROLOGY

## 2021-06-08 RX ORDER — TRAZODONE HYDROCHLORIDE 50 MG/1
TABLET ORAL
Qty: 30 TABLET | Refills: 5 | Status: SHIPPED | OUTPATIENT
Start: 2021-06-08 | End: 2021-06-08 | Stop reason: SDUPTHER

## 2021-06-08 RX ORDER — TRAZODONE HYDROCHLORIDE 50 MG/1
TABLET ORAL
Qty: 60 TABLET | Refills: 5 | Status: SHIPPED | OUTPATIENT
Start: 2021-06-08 | End: 2021-11-24

## 2021-06-08 NOTE — PROGRESS NOTES
[id number] [facility] Neurology and Sleep  [address] [place] [address] Drive, [address] [place] [address],[address]  [address] [facility], [address]  Phone [phone]  Fax [phone]     [address], [address] Follow Up Encounter  21 1:34 PM CDT    Information:   Patient Name: Clair Gregg  :   1980  Age:   36 y.o. MRN:   [id number]  Account #:  [de-identified]  Today:  21    Provider: Jasmyne Schrader M.D. Chief Complaint:   Chief Complaint   Patient presents with    6 Month Follow-Up    Sleep Apnea       Subjective:   Clair Gregg is a 36 y.o. man with a history of obstructive sleep apnea who is following up. He previously had a home sleep apnea test and was set up on APAP. He did not tolerate that. He had an attended re titration polysomnogram that showed he needed CPAP at 9cm and his APAP was set to a much lower pressure range of 6cm to 10cm. He is doing much better with CPAP use. He puts it on every night but wakens often with the mask off his face. He is not sure what happens. He does not remember taking the mask off. He does rest better when he keeps it on. Objective:     Past Medical History:  Past Medical History:   Diagnosis Date    Hypertension        Past Surgical History:   Procedure Laterality Date    GASTRIC BYPASS SURGERY         Recent Hospitalizations  · None    Significant Injuries  · None    Habits  Clair Gregg reports that he has never smoked. He has never used smokeless tobacco. He reports current alcohol use. He reports that he does not use drugs. History reviewed. No pertinent family history. Social History  Clair Gregg is single, lives in [address], and works for [employer].     Medications:  Current Outpatient Medications   Medication Sig Dispense Refill    hydrOXYzine (ATARAX) 50 MG tablet Take 1 tablet by mouth every 6 hours as needed for Anxiety 60 tablet 5    pantoprazole (PROTONIX) 40 MG tablet Take 1 tablet by mouth 2 times daily 60 tablet 11    Insulin Pen Needle (B-D ULTRAFINE III SHORT PEN) 31G X 8 MM MISC Use with Saxenda Daily 100 each 1    fluticasone (FLONASE) 50 MCG/ACT nasal spray 2 sprays by Nasal route daily 3 Bottle 3    mupirocin (BACTROBAN) 2 % ointment APPLY TO AFFECTED AREA THREE TIMES DAILY 22 g 1    epinastine (ELESTAT) 0.05 % SOLN PLACE ONE DROP INTO BOTH EYES TWICE A DAY. 5 mL 1    liraglutide-weight management (SAXENDA) 18 MG/3ML SOPN Inject 2.4 mg as directed daily 6 mL 5    cetirizine (ZYRTEC) 10 MG tablet Take 1 tablet by mouth daily 30 tablet 11    montelukast (SINGULAIR) 10 MG tablet Take 1 tablet by mouth nightly 30 tablet 11    triamcinolone (KENALOG) 0.1 % ointment       nystatin (MYCOSTATIN) 088648 UNIT/GM powder Apply topically 3 times daily Apply 3 times daily. 1 Bottle 3    EPINEPHrine (EPIPEN 2-KEIKO) 0.3 MG/0.3ML SOAJ injection Inject 0.3 mLs into the skin once for 1 dose Use as directed for allergic reaction 0.3 mL 3     No current facility-administered medications for this visit. Allergies:   Allergies as of 06/08/2021 - Fully Reviewed 06/08/2021   Allergen Reaction Noted    Cat hair extract Anaphylaxis 08/08/2017    Dog epithelium Anaphylaxis 08/08/2017    Penicillins Anaphylaxis 05/06/2014    Meat extract  01/02/2019       Review of Systems:  Constitutional: negative for - chills and fever  Eyes:  negative for - visual disturbance and photophobia  HENMT: negative for - headaches and sinus pain  Respiratory: negative for - cough, hemoptysis, and shortness of breath  Cardiovascular: negative for - chest pain and palpitations  Gastrointestinal: negative for - blood in stools, constipation, diarrhea, nausea, and vomiting  Genito-Urinary: negative for - hematuria, urinary frequency, urinary urgency, and urinary retention  Musculoskeletal: negative for - joint pain, joint stiffness, and joint swelling  Hematological and Lymphatic: negative for - bleeding problems, abnormal bruising, and swollen lymph nodes  Endocrine:  negative for - polydipsia and polyphagia  Allergy/Immunology:  negative for - rhinorrhea, sinus congestion, hives  Integument:  negative for - negative for - rash, change in moles, new or changing lesions  Psychological: negative for - anxiety and depression  Neurological: negative for - memory loss, numbness/tingling, and weakness     PHYSICAL EXAMINATION:  Vitals:  /81   Pulse 74   Resp 18   Ht 6' 1\" (1.854 m)   Wt (!) 340 lb (154.2 kg)   BMI 44.86 kg/m²   General appearance:  Alert, well developed, well nourished, in no distress  HEENT:  normocephalic, atraumatic, sclera appear normal, no nasal abnormalities, no rhinorrhea, Ears appear normal, oral mucous membranes are moist without erythema, trachea midline, thyroid is normal, no lymphadenopathy or neck mass. IV (only hard palate visible). Cardiovascular:  Regular rate and rhythm without murmer. No peripheral edema, No cyanosis or clubbing. No carotid bruits. Pulmonary:  Lungs are clear to auscultation. Breathing appears normal, good expansion, normal effort without use of accessory muscles  Musculoskeletal:  Joints are normal.  No splints, slings, or casts. Spine appears normal with normal posture and range of motion. Integument:  No rash, erythema, or pallor  Psychiatric:  Mood, affect, and behavior appear normal      NEUROLOGIC EXAMINATION:  Mental Status:  alert, oriented to person, place, and time. Speech:  Clear without dysarthria or dysphonia  Language:  Fluent without aphasia  Cranial Nerves:   II Visual fields are full to confrontation   III,IV, VI Extraocular movements are full   VII Facial movements are symmetrical without weakness   VIII Hearing is intact   XII No tongue atrophy or fasciculations. Normal tongue protrusion. No tongue weakness  Motor:  Normal strength in both upper and lower extremities. Normal muscle tone and bulk. Deep tendon reflexes are intact and symmetrical in both upper and lower extremities.   Jarrett's signs are absent bilaterally. There is no ankle clonus on either side. Sensation:  Sensation is intact to light touch and vibration in all extremities. Coordination:  Rapid alternating movements are normal in both upper and lower extremities. Finger to nose testing is unimpaired bilaterally. Gait:  Normal station and gait. Pertinent Diagnostic Studies:      APAP download shows that he uses the device at 8cm to 10cm 77% of nights but only 10% > 4 hours. Assessment:       ICD-10-CM    1. Sleep apnea, obstructive  G47.33      I discussed the diagnosis of obstructive sleep apnea with Kandice Gibson including the pathophysiology (namely the mechanism of breathing and obstruction of upper airway, interruptions of sleep, hypoxemia, hypercapnia, and results of repetitive sympathetic activation), risks, evaluation, and treatment options. I discussed the risks of driving when drowsy and advised that Kandice Gibson not drive when drowsy and avoid sedating medications and respiratory suppressants. Plan:   1. Use CPAP nightly. I discussed definition of compliance with him and possible repercussions of noncompliance.   2. FU in a year    Electronically signed by Scott Moe MD on 6/8/21

## 2021-08-23 DIAGNOSIS — Z91.09 ENVIRONMENTAL ALLERGIES: ICD-10-CM

## 2021-08-25 RX ORDER — MONTELUKAST SODIUM 10 MG/1
10 TABLET ORAL NIGHTLY
Qty: 30 TABLET | Refills: 5 | Status: SHIPPED | OUTPATIENT
Start: 2021-08-25 | End: 2022-02-25

## 2021-08-25 NOTE — TELEPHONE ENCOUNTER
Received fax from pharmacy requesting refill on pts medication(s). Pt was last seen in office on 1/26/2021  and has a follow up scheduled for Visit date not found. Will send request to  [facility]  for patient.      Requested Prescriptions     Pending Prescriptions Disp Refills    montelukast (SINGULAIR) 10 MG tablet [Pharmacy Med Name: MONTELUKAST SOD 10 MG TAB 10 Tablet] 30 tablet 11     Sig: TAKE 1 TABLET BY MOUTH NIGHTLY

## 2021-09-03 DIAGNOSIS — J01.91 ACUTE RECURRENT SINUSITIS, UNSPECIFIED LOCATION: Primary | ICD-10-CM

## 2021-09-03 RX ORDER — AZITHROMYCIN 250 MG/1
250 TABLET, FILM COATED ORAL SEE ADMIN INSTRUCTIONS
Qty: 6 TABLET | Refills: 0 | Status: SHIPPED | OUTPATIENT
Start: 2021-09-03 | End: 2021-09-08

## 2021-09-03 RX ORDER — METHYLPREDNISOLONE 4 MG/1
TABLET ORAL
Qty: 1 KIT | Refills: 0 | Status: SHIPPED | OUTPATIENT
Start: 2021-09-03 | End: 2021-09-09

## 2021-09-23 DIAGNOSIS — Z91.09 ENVIRONMENTAL ALLERGIES: ICD-10-CM

## 2021-09-23 RX ORDER — CETIRIZINE HYDROCHLORIDE 10 MG/1
10 TABLET ORAL DAILY
Qty: 90 TABLET | Refills: 3 | Status: SHIPPED | OUTPATIENT
Start: 2021-09-23 | End: 2022-08-05

## 2021-09-23 NOTE — TELEPHONE ENCOUNTER
Received fax from pharmacy requesting refill on pts medication(s). Pt was last seen in office on 1/26/2021  and has a follow up scheduled for Visit date not found. Will send request to  [facility]  for patient. Requested Prescriptions     Pending Prescriptions Disp Refills    cetirizine (ZYRTEC) 10 MG tablet [Pharmacy Med Name: CETIRIZINE HCL 10 MG TABS 10 Tablet] 30 tablet 11     Sig: TAKE ONE TABLET BY MOUTH DAILY.

## 2021-10-26 ENCOUNTER — PATIENT MESSAGE (OUTPATIENT)
Dept: PRIMARY CARE CLINIC | Age: 41
End: 2021-10-26

## 2021-10-26 ENCOUNTER — VIRTUAL VISIT (OUTPATIENT)
Dept: PRIMARY CARE CLINIC | Age: 41
End: 2021-10-26
Payer: COMMERCIAL

## 2021-10-26 DIAGNOSIS — J01.01 ACUTE RECURRENT MAXILLARY SINUSITIS: Primary | ICD-10-CM

## 2021-10-26 DIAGNOSIS — Z91.09 ENVIRONMENTAL ALLERGIES: ICD-10-CM

## 2021-10-26 PROCEDURE — 99213 OFFICE O/P EST LOW 20 MIN: CPT | Performed by: NURSE PRACTITIONER

## 2021-10-26 RX ORDER — METHYLPREDNISOLONE 4 MG/1
TABLET ORAL
Qty: 1 KIT | Refills: 1 | Status: SHIPPED | OUTPATIENT
Start: 2021-10-26 | End: 2021-11-01

## 2021-10-26 RX ORDER — AZITHROMYCIN 250 MG/1
250 TABLET, FILM COATED ORAL SEE ADMIN INSTRUCTIONS
Qty: 6 TABLET | Refills: 1 | Status: SHIPPED | OUTPATIENT
Start: 2021-10-26 | End: 2021-10-31

## 2021-10-26 ASSESSMENT — ENCOUNTER SYMPTOMS
COUGH: 1
RHINORRHEA: 1
SINUS PRESSURE: 1

## 2021-10-26 NOTE — TELEPHONE ENCOUNTER
[facility]  To: LASHONDA Sorenson  Sent: 10/26/2021 6:57 AM CDT  Subject: Non-Urgent Medical Question    Gertrude Ledbetter,  It's me again Eboni. I really, really hate this weather. I once again am being hit with a sinus infection. I mowed the other day and woke up with this mess, again. Do you want to see me, or call in predinsone and a zpack?

## 2021-10-26 NOTE — PROGRESS NOTES
Romayne Dixons (:  1980) is a 36 y.o. male,Established patient, here for evaluation of the following chief complaint(s): Sinus Problem    DOXY. ME       ASSESSMENT/PLAN:  1. Acute recurrent maxillary sinusitis  -     azithromycin (ZITHROMAX) 250 MG tablet; Take 1 tablet by mouth See Admin Instructions for 5 days 500mg on day 1 followed by 250mg on days 2 - 5, Disp-6 tablet, R-1Normal  -     methylPREDNISolone (MEDROL DOSEPACK) 4 MG tablet; Take by mouth., Disp-1 kit, R-1Normal  2. Environmental allergies--continue Zyrtec 10 mg daily, Singulair 10 mg nightly, Flonase 2 sprays in each nostril daily, and saline nasal rinses as needed      Return if symptoms worsen or fail to improve. SUBJECTIVE/OBJECTIVE:  HPI     SINUSITIS:  Symptoms started last week. \"I mowed at work and I woke up the next day with sneezing and snot. \"  \"I started taking some OTC meds and it is getting worse. \"  Denies a fever  Reports nasal congestion, drainage and sinus pressure. Pressure is in the maxillary and frontal region. He took Octavia Bye and MDP in September  Symptoms resolved until last week. He continues on Zyrtec, Singulair, Flonase and [employer]. He is COVID vaccinated, Evy Silver    Review of Systems   Constitutional: Negative for fever. HENT: Positive for congestion, rhinorrhea and sinus pressure. Respiratory: Positive for cough. No flowsheet data found. Physical Exam  Constitutional:       Appearance: He is obese. HENT:      Head: Normocephalic. Right Ear: External ear normal.      Left Ear: External ear normal.      Nose: Nose normal.   Eyes:      General:         Right eye: No discharge. Left eye: No discharge. Pulmonary:      Effort: Pulmonary effort is normal.   Musculoskeletal:         General: Normal range of motion. Cervical back: Normal range of motion. Neurological:      General: No focal deficit present.       Mental Status: He is alert and oriented to person, place, and time. Mental status is at baseline. Psychiatric:         Mood and Affect: Mood normal.         Behavior: Behavior normal.         Thought Content: Thought content normal.         Judgment: Judgment normal.              Vernia Dense, was evaluated through a synchronous (real-time) audio-video encounter. The patient (or guardian if applicable) is aware that this is a billable service. Verbal consent to proceed has been obtained within the past 12 months. The visit was conducted pursuant to the emergency declaration under the [address] [place] [address] authority and the [employer] and [employer] General Act. Patient identification was verified, and a caregiver was present when appropriate. The patient was located in a state where the provider was credentialed to provide care. An electronic signature was used to authenticate this note.     --LASHONDA Zuñiga

## 2021-11-23 DIAGNOSIS — R21 RASH DUE TO ALLERGY: ICD-10-CM

## 2021-11-23 DIAGNOSIS — Z91.09 ENVIRONMENTAL ALLERGIES: ICD-10-CM

## 2021-11-23 DIAGNOSIS — T78.40XA RASH DUE TO ALLERGY: ICD-10-CM

## 2021-11-23 RX ORDER — HYDROXYZINE 50 MG/1
50 TABLET, FILM COATED ORAL EVERY 6 HOURS PRN
Qty: 60 TABLET | Refills: 5 | Status: SHIPPED | OUTPATIENT
Start: 2021-11-23 | End: 2022-06-01

## 2021-11-23 NOTE — TELEPHONE ENCOUNTER
Received fax from pharmacy requesting refill on pts medication(s). Pt was last seen in office on 10/26/2021  and has a follow up scheduled for Visit date not found. Will send request to  [facility] Manzanares  for authorization.      Requested Prescriptions     Pending Prescriptions Disp Refills    hydrOXYzine (ATARAX) 50 MG tablet [Pharmacy Med Name: HYDROXYZINE HCL 50 MG TAB 50 Tablet] 60 tablet 5     Sig: TAKE 1 TABLET BY MOUTH EVERY 6 HOURS AS NEEDED FOR ANXIETY

## 2021-11-24 RX ORDER — TRAZODONE HYDROCHLORIDE 50 MG/1
TABLET ORAL
Qty: 30 TABLET | Refills: 5 | Status: SHIPPED | OUTPATIENT
Start: 2021-11-24 | End: 2022-06-01

## 2021-12-03 ENCOUNTER — OFFICE VISIT (OUTPATIENT)
Dept: PRIMARY CARE CLINIC | Age: 41
End: 2021-12-03
Payer: COMMERCIAL

## 2021-12-03 VITALS
TEMPERATURE: 97.9 F | SYSTOLIC BLOOD PRESSURE: 110 MMHG | DIASTOLIC BLOOD PRESSURE: 80 MMHG | HEART RATE: 79 BPM | OXYGEN SATURATION: 98 % | WEIGHT: 315 LBS | HEIGHT: 73 IN | BODY MASS INDEX: 41.75 KG/M2

## 2021-12-03 DIAGNOSIS — R09.1 PLEURISY: ICD-10-CM

## 2021-12-03 DIAGNOSIS — Z00.00 ENCOUNTER FOR PREVENTIVE HEALTH EXAMINATION: ICD-10-CM

## 2021-12-03 DIAGNOSIS — Z00.00 ENCOUNTER FOR PREVENTIVE HEALTH EXAMINATION: Primary | ICD-10-CM

## 2021-12-03 DIAGNOSIS — E66.01 CLASS 3 SEVERE OBESITY DUE TO EXCESS CALORIES WITHOUT SERIOUS COMORBIDITY WITH BODY MASS INDEX (BMI) OF 45.0 TO 49.9 IN ADULT (HCC): ICD-10-CM

## 2021-12-03 DIAGNOSIS — G47.33 OBSTRUCTIVE SLEEP APNEA SYNDROME: ICD-10-CM

## 2021-12-03 LAB
ALBUMIN SERPL-MCNC: 4.4 G/DL (ref 3.5–5.2)
ALP BLD-CCNC: 133 U/L (ref 40–130)
ALT SERPL-CCNC: 21 U/L (ref 5–41)
ANION GAP SERPL CALCULATED.3IONS-SCNC: 16 MMOL/L (ref 7–19)
AST SERPL-CCNC: 22 U/L (ref 5–40)
BASOPHILS ABSOLUTE: 0 K/UL (ref 0–0.2)
BASOPHILS RELATIVE PERCENT: 0.6 % (ref 0–1)
BILIRUB SERPL-MCNC: 0.4 MG/DL (ref 0.2–1.2)
BUN BLDV-MCNC: 11 MG/DL (ref 6–20)
CALCIUM SERPL-MCNC: 9 MG/DL (ref 8.6–10)
CHLORIDE BLD-SCNC: 105 MMOL/L (ref 98–111)
CHOLESTEROL, TOTAL: 186 MG/DL (ref 160–199)
CO2: 22 MMOL/L (ref 22–29)
CREAT SERPL-MCNC: 0.8 MG/DL (ref 0.5–1.2)
EOSINOPHILS ABSOLUTE: 0.2 K/UL (ref 0–0.6)
EOSINOPHILS RELATIVE PERCENT: 4.5 % (ref 0–5)
GFR AFRICAN AMERICAN: >59
GFR NON-AFRICAN AMERICAN: >60
GLUCOSE BLD-MCNC: 93 MG/DL (ref 74–109)
HCT VFR BLD CALC: 43.1 % (ref 42–52)
HDLC SERPL-MCNC: 55 MG/DL (ref 55–121)
HEMOGLOBIN: 13.8 G/DL (ref 14–18)
IMMATURE GRANULOCYTES #: 0 K/UL
LDL CHOLESTEROL CALCULATED: 103 MG/DL
LYMPHOCYTES ABSOLUTE: 0.9 K/UL (ref 1.1–4.5)
LYMPHOCYTES RELATIVE PERCENT: 17.4 % (ref 20–40)
MCH RBC QN AUTO: 27.2 PG (ref 27–31)
MCHC RBC AUTO-ENTMCNC: 32 G/DL (ref 33–37)
MCV RBC AUTO: 85 FL (ref 80–94)
MONOCYTES ABSOLUTE: 0.4 K/UL (ref 0–0.9)
MONOCYTES RELATIVE PERCENT: 7.7 % (ref 0–10)
NEUTROPHILS ABSOLUTE: 3.7 K/UL (ref 1.5–7.5)
NEUTROPHILS RELATIVE PERCENT: 69.2 % (ref 50–65)
PDW BLD-RTO: 13.2 % (ref 11.5–14.5)
PLATELET # BLD: 235 K/UL (ref 130–400)
PMV BLD AUTO: 9.9 FL (ref 9.4–12.4)
POTASSIUM SERPL-SCNC: 4 MMOL/L (ref 3.5–5)
RBC # BLD: 5.07 M/UL (ref 4.7–6.1)
SODIUM BLD-SCNC: 143 MMOL/L (ref 136–145)
T4 FREE: 1.19 NG/DL (ref 0.93–1.7)
TOTAL PROTEIN: 7.2 G/DL (ref 6.6–8.7)
TRIGL SERPL-MCNC: 140 MG/DL (ref 0–149)
TSH SERPL DL<=0.05 MIU/L-ACNC: 1.15 UIU/ML (ref 0.27–4.2)
WBC # BLD: 5.3 K/UL (ref 4.8–10.8)

## 2021-12-03 PROCEDURE — 96372 THER/PROPH/DIAG INJ SC/IM: CPT | Performed by: NURSE PRACTITIONER

## 2021-12-03 PROCEDURE — 99396 PREV VISIT EST AGE 40-64: CPT | Performed by: NURSE PRACTITIONER

## 2021-12-03 RX ORDER — DEXAMETHASONE SODIUM PHOSPHATE 4 MG/ML
8 INJECTION, SOLUTION INTRA-ARTICULAR; INTRALESIONAL; INTRAMUSCULAR; INTRAVENOUS; SOFT TISSUE ONCE
Status: COMPLETED | OUTPATIENT
Start: 2021-12-03 | End: 2021-12-03

## 2021-12-03 RX ORDER — METHYLPREDNISOLONE 4 MG/1
TABLET ORAL
Qty: 1 KIT | Refills: 0 | Status: SHIPPED | OUTPATIENT
Start: 2021-12-03 | End: 2021-12-09

## 2021-12-03 RX ADMIN — DEXAMETHASONE SODIUM PHOSPHATE 8 MG: 4 INJECTION, SOLUTION INTRA-ARTICULAR; INTRALESIONAL; INTRAMUSCULAR; INTRAVENOUS; SOFT TISSUE at 10:18

## 2021-12-03 ASSESSMENT — ENCOUNTER SYMPTOMS
VOMITING: 0
CONSTIPATION: 0
COUGH: 0
SORE THROAT: 0
DIARRHEA: 0
RHINORRHEA: 1
EYE REDNESS: 0
SHORTNESS OF BREATH: 0

## 2021-12-03 ASSESSMENT — PATIENT HEALTH QUESTIONNAIRE - PHQ9
SUM OF ALL RESPONSES TO PHQ QUESTIONS 1-9: 0
1. LITTLE INTEREST OR PLEASURE IN DOING THINGS: 0
2. FEELING DOWN, DEPRESSED OR HOPELESS: 0
SUM OF ALL RESPONSES TO PHQ9 QUESTIONS 1 & 2: 0

## 2021-12-03 NOTE — PROGRESS NOTES
12/3/2021    Allen Vergara (:  1980) is a 39 y.o. male, here for a preventive medicine evaluation. Patient Active Problem List   Diagnosis    Obstructive sleep apnea syndrome    Class 3 severe obesity due to excess calories without serious comorbidity with body mass index (BMI) of 45.0 to 49.9 in adult [facility])    Environmental allergies     CHEST PRESSURE:  \"It hurts when I cough. \"  \"I feel like I have pleurisy again. \"   \"I felt like I could not get a full deep breath. \"  Reports cough and congestion a few days prior to it  Denies CP  Denies a fever. BELEM is well controlled    QUENTIN:  He is wearing his CPAP nightly. He is sleeping well. Eyes: 2021  Dentist:  Next week  Labs:  Due  Nocturia:  1x  Stream: Steady  Colonoscopy:  Denies blood in stool  Exercise: \"I am starting to get back into it. \"  Diet and Nut:  Small portions  MVI:  Yes when I remember to take it  Supplements:  No  Asa: No  Depression:  No concerns  Body Image: Obese  Fall:  No  EOL:  No  Tobacco: No smoking history  No chew   Substance: ETOH socially  Immunization:  Defers flu shot  Sleep: CPAP  Cognition: No concerns      Review of Systems   Constitutional: Negative for chills, fatigue and fever. HENT: Positive for congestion and rhinorrhea. Negative for ear pain and sore throat. Eyes: Negative for redness. Respiratory: Negative for cough and shortness of breath. When I breath in, it hurts a bit. When I take a deep breath there is a sharp pain then it is gone. Cardiovascular: Negative for chest pain. Gastrointestinal: Negative for constipation, diarrhea and vomiting. BELEM is well controlled   Skin: Negative for rash. Neurological: Negative for dizziness and headaches. Psychiatric/Behavioral: Negative for sleep disturbance (controlled on current regimen). The patient is not nervous/anxious. Prior to Visit Medications    Medication Sig Taking?  Authorizing Provider   methylPREDNISolone (MEDROL DOSEPACK) 4 MG tablet Take by mouth. Yes LASHONDA Arrieta   traZODone (DESYREL) 50 MG tablet TAKE ONE TO TWO TABLETS BY MOUTH EVERY NIGHT AT BEDTIME Yes Johny Horton MD   hydrOXYzine (ATARAX) 50 MG tablet TAKE 1 TABLET BY MOUTH EVERY 6 HOURS AS NEEDED FOR ANXIETY Yes LASHONDA Arrieta   cetirizine (ZYRTEC) 10 MG tablet Take 1 tablet by mouth daily Yes LASHONDA Arrieta   montelukast (SINGULAIR) 10 MG tablet Take 1 tablet by mouth nightly Yes LASHONDA Muniz   pantoprazole (PROTONIX) 40 MG tablet Take 1 tablet by mouth 2 times daily Yes LASHONDA Faustin   Insulin Pen Needle (B-D ULTRAFINE III SHORT PEN) 31G X 8 MM MISC Use with Saxenda Daily Yes LASHONDA Muniz   fluticasone (FLONASE) 50 MCG/ACT nasal spray 2 sprays by Nasal route daily Yes LASHONDA Arrieta   mupirocin (BACTROBAN) 2 % ointment APPLY TO AFFECTED AREA THREE TIMES DAILY Yes LASHONDA Arrieta   epinastine (ELESTAT) 0.05 % SOLN PLACE ONE DROP INTO BOTH EYES TWICE A DAY. Yes LASHONDA Faustin   liraglutide-weight management (SAXENDA) 18 MG/3ML SOPN Inject 2.4 mg as directed daily Yes LASHONDA Arrieta   triamcinolone (KENALOG) 0.1 % ointment  Yes Historical Provider, MD   nystatin (MYCOSTATIN) 767349 UNIT/GM powder Apply topically 3 times daily Apply 3 times daily.  Yes LASHONDA Arrieta   EPINEPHrine (EPIPEN 2-KEIKO) 0.3 MG/0.3ML SOAJ injection Inject 0.3 mLs into the skin once for 1 dose Use as directed for allergic reaction Yes LASHONDA Arrieta        Allergies   Allergen Reactions    Cat Hair Extract Anaphylaxis    Dog Epithelium Anaphylaxis    Penicillins Anaphylaxis    Meat Extract        Past Medical History:   Diagnosis Date    Hypertension        Past Surgical History:   Procedure Laterality Date    GASTRIC BYPASS SURGERY         Social History     Socioeconomic History    Marital status: Single     Spouse name: Not on file    Number of children: Not on file  Years of education: Not on file    Highest education level: Not on file   Occupational History    Not on file   Tobacco Use    Smoking status: Never Smoker    Smokeless tobacco: Never Used   Substance and Sexual Activity    Alcohol use: Yes     Comment: rarely    Drug use: No    Sexual activity: Not on file   Other Topics Concern    Not on file   Social History Narrative    Not on file     Social Determinants of Health     Financial Resource Strain:     Difficulty of Paying Living Expenses: Not on file   Food Insecurity:     Worried About Running Out of Food in the Last Year: Not on file    Arden of Food in the Last Year: Not on file   Transportation Needs:     Lack of Transportation (Medical): Not on file    Lack of Transportation (Non-Medical): Not on file   Physical Activity:     Days of Exercise per Week: Not on file    Minutes of Exercise per Session: Not on file   Stress:     Feeling of Stress : Not on file   Social Connections:     Frequency of Communication with Friends and Family: Not on file    Frequency of Social Gatherings with Friends and Family: Not on file    Attends Buddhism Services: Not on file    Active Member of [address] [place] [address] or Organizations: Not on file    Attends Club or Organization Meetings: Not on file    Marital Status: Not on file   Intimate Partner Violence:     Fear of Current or Ex-Partner: Not on file    Emotionally Abused: Not on file    Physically Abused: Not on file    Sexually Abused: Not on file   Housing Stability:     Unable to Pay for Housing in the Last Year: Not on file    Number of [place] in the Last Year: Not on file    Unstable Housing in the Last Year: Not on file        No family history on file.     ADVANCE DIRECTIVE: N, <no information>    Vitals:    12/03/21 0936   BP: 110/80   Pulse: 79   Temp: 97.9 °F (36.6 °C)   TempSrc: Temporal   SpO2: 98%   Weight: (!) 353 lb 8 oz (160.3 kg)   Height: 6' 1\" (1.854 m)     Estimated body mass index is 46.64 kg/m² as calculated from the following:    Height as of this encounter: 6' 1\" (1.854 m). Weight as of this encounter: 353 lb 8 oz (160.3 kg). Physical Exam  Vitals reviewed. Constitutional:       Appearance: He is well-developed. He is obese. HENT:      Head: Normocephalic. Right Ear: Tympanic membrane, ear canal and external ear normal.      Left Ear: Tympanic membrane, ear canal and external ear normal.      Nose: Nose normal.   Eyes:      General:         Right eye: No discharge. Left eye: No discharge. Neck:      Vascular: No carotid bruit. Cardiovascular:      Rate and Rhythm: Normal rate and regular rhythm. Pulmonary:      Effort: Pulmonary effort is normal.      Breath sounds: Normal breath sounds. No wheezing, rhonchi or rales. Abdominal:      General: Bowel sounds are normal.      Palpations: Abdomen is soft. Musculoskeletal:         General: Normal range of motion. Cervical back: Normal range of motion. Lymphadenopathy:      Cervical: No cervical adenopathy. Skin:     General: Skin is dry. Neurological:      General: No focal deficit present. Mental Status: He is alert and oriented to person, place, and time. Mental status is at baseline. Psychiatric:         Mood and Affect: Mood normal.         Behavior: Behavior normal.         Thought Content: Thought content normal.         Judgment: Judgment normal.        No flowsheet data found.     Lab Results   Component Value Date    CHOL 169 11/13/2020    CHOL 181 11/06/2019    CHOL 170 06/12/2018    TRIG 127 11/13/2020    TRIG 130 11/06/2019    TRIG 146 06/12/2018    HDL 58 11/13/2020    HDL 54 11/06/2019    HDL 54 06/12/2018    LDLCALC 86 11/13/2020    LDLCALC 101 11/06/2019    LDLCALC 87 06/12/2018    GLUCOSE 86 11/13/2020    LABA1C 5.3 11/13/2020       The 10-year ASCVD risk score (Clay Parekh, et al., 2013) is: 0.6%    Values used to calculate the score:      Age: 39 years      Sex: Male Is Non- : No      Diabetic: No      Tobacco smoker: No      Systolic Blood Pressure: 525 mmHg      Is BP treated: No      HDL Cholesterol: 58 mg/dL      Total Cholesterol: 169 mg/dL    Immunization History   Administered Date(s) Administered    COVID-19, Moderna, Primary or Immunocompromised, PF, 100mcg/0.5mL 08/13/2021, 09/10/2021    Hepatitis A Adult ([place]) 06/12/2018       Health Maintenance   Topic Date Due    Hepatitis C screen  Never done    Varicella vaccine (1 of 2 - 2-dose childhood series) Never done    DTaP/Tdap/Td vaccine (1 - Tdap) Never done    Flu vaccine (1) Never done    Lipid screen  11/13/2025    COVID-19 Vaccine  Completed    HIV screen  Completed    Hepatitis A vaccine  Aged Out    Hepatitis B vaccine  Aged Out    Hib vaccine  Aged Out    Meningococcal (ACWY) vaccine  Aged Out    Pneumococcal 0-64 years Vaccine  Aged Out          ASSESSMENT/PLAN:  1. Encounter for preventive health examination  -     CBC Auto Differential; Future  -     Comprehensive Metabolic Panel; Future  -     TSH without Reflex; Future  -     T4, Free; Future  -     Lipid Panel; Future  2. Pleurisy  -     dexamethasone (DECADRON) injection 8 mg; 8 mg, IntraMUSCular, ONCE, On Fri 12/3/21 at 1030, For 1 dose  -     methylPREDNISolone (MEDROL DOSEPACK) 4 MG tablet; Take by mouth., Disp-1 kit, R-0Normal  3. Obstructive sleep apnea syndrome--continue CPAP nightly  4. Class 3 severe obesity due to excess calories without serious comorbidity with body mass index (BMI) of 45.0 to 49.9 in adult (HCC)--recommend diet and exercise      Return if symptoms worsen or fail to improve. An electronic signature was used to authenticate this note.     --LASHONDA Gerard on 12/3/2021 at 11:40 AM

## 2021-12-03 NOTE — PROGRESS NOTES
After obtaining consent from [facility] APRJAMES, gave patient dexamethasone 8mg injection in Right upper quad. gluteus, patient tolerated well. Medication was not supplied by the patient.

## 2021-12-21 DIAGNOSIS — J01.01 ACUTE RECURRENT MAXILLARY SINUSITIS: ICD-10-CM

## 2021-12-21 RX ORDER — AZITHROMYCIN 250 MG/1
TABLET, FILM COATED ORAL
Qty: 6 TABLET | Refills: 1 | OUTPATIENT
Start: 2021-12-21

## 2021-12-21 RX ORDER — METHYLPREDNISOLONE 4 MG/1
TABLET ORAL
Qty: 21 TABLET | Refills: 1 | OUTPATIENT
Start: 2021-12-21

## 2022-02-25 DIAGNOSIS — Z91.09 ENVIRONMENTAL ALLERGIES: ICD-10-CM

## 2022-02-25 RX ORDER — MONTELUKAST SODIUM 10 MG/1
10 TABLET ORAL NIGHTLY
Qty: 90 TABLET | Refills: 3 | Status: SHIPPED | OUTPATIENT
Start: 2022-02-25

## 2022-02-25 NOTE — TELEPHONE ENCOUNTER
Received fax from pharmacy requesting refill on pts medication(s). Pt was last seen in office on 12/3/2021  and has a follow up scheduled for Visit date not found. Will send request to  [facility]  for patient. Requested Prescriptions     Pending Prescriptions Disp Refills    montelukast (SINGULAIR) 10 MG tablet [Pharmacy Med Name: MONTELUKAST SOD 10 MG TAB 10 Tablet] 30 tablet 5     Sig: TAKE ONE TABLET BY MOUTH NIGHTLY.

## 2022-03-23 DIAGNOSIS — E66.01 CLASS 3 SEVERE OBESITY DUE TO EXCESS CALORIES WITHOUT SERIOUS COMORBIDITY WITH BODY MASS INDEX (BMI) OF 45.0 TO 49.9 IN ADULT (HCC): ICD-10-CM

## 2022-03-23 RX ORDER — PEN NEEDLE, DIABETIC 31 GX5/16"
NEEDLE, DISPOSABLE MISCELLANEOUS
Qty: 100 EACH | Refills: 1 | Status: SHIPPED | OUTPATIENT
Start: 2022-03-23

## 2022-03-23 NOTE — TELEPHONE ENCOUNTER
Requested Prescriptions     Pending Prescriptions Disp Refills    B-D ULTRAFINE III SHORT PEN 31G X 8 MM MISC [Pharmacy Med Name: BD ULTRA-FINE PEN NDL 8MMX3 31G X 8 MM Miscellaneous] 100 each 1     Sig: USE WITH SAXENDA DAILY

## 2022-05-27 ENCOUNTER — PATIENT MESSAGE (OUTPATIENT)
Dept: PRIMARY CARE CLINIC | Age: 42
End: 2022-05-27

## 2022-05-27 DIAGNOSIS — K64.9 HEMORRHOIDS, UNSPECIFIED HEMORRHOID TYPE: Primary | ICD-10-CM

## 2022-05-27 RX ORDER — HYDROCORTISONE ACETATE 25 MG/1
25 SUPPOSITORY RECTAL 2 TIMES DAILY PRN
Qty: 12 SUPPOSITORY | Refills: 1 | Status: SHIPPED | OUTPATIENT
Start: 2022-05-27

## 2022-05-27 NOTE — TELEPHONE ENCOUNTER
From: Troy Gregory  To: Britney Dumont  Sent: 5/27/2022 11:51 AM CDT  Subject: Question    I made an appointment to see you on the 31st for this skin rash. However I wanted to add that I just noticed that im bleeding after a bowel movement. Sorry this is embarassing. Its a bright red colored blood.  So was thinking I may have a hemmroid

## 2022-05-31 ENCOUNTER — OFFICE VISIT (OUTPATIENT)
Dept: PRIMARY CARE CLINIC | Age: 42
End: 2022-05-31
Payer: COMMERCIAL

## 2022-05-31 VITALS
HEART RATE: 89 BPM | WEIGHT: 315 LBS | TEMPERATURE: 98.9 F | BODY MASS INDEX: 41.75 KG/M2 | HEIGHT: 73 IN | SYSTOLIC BLOOD PRESSURE: 126 MMHG | OXYGEN SATURATION: 99 % | DIASTOLIC BLOOD PRESSURE: 84 MMHG

## 2022-05-31 DIAGNOSIS — R21 RASH AND NONSPECIFIC SKIN ERUPTION: Primary | ICD-10-CM

## 2022-05-31 DIAGNOSIS — E66.01 CLASS 3 SEVERE OBESITY DUE TO EXCESS CALORIES WITHOUT SERIOUS COMORBIDITY WITH BODY MASS INDEX (BMI) OF 45.0 TO 49.9 IN ADULT (HCC): ICD-10-CM

## 2022-05-31 PROCEDURE — 99213 OFFICE O/P EST LOW 20 MIN: CPT | Performed by: NURSE PRACTITIONER

## 2022-05-31 RX ORDER — METHYLPREDNISOLONE 4 MG/1
TABLET ORAL
Qty: 1 KIT | Refills: 0 | Status: SHIPPED | OUTPATIENT
Start: 2022-05-31 | End: 2022-06-06

## 2022-05-31 ASSESSMENT — ENCOUNTER SYMPTOMS
RHINORRHEA: 1
SORE THROAT: 0
COUGH: 0
CONSTIPATION: 0
VOMITING: 0
DIARRHEA: 0
EYE REDNESS: 0
SHORTNESS OF BREATH: 0

## 2022-05-31 ASSESSMENT — PATIENT HEALTH QUESTIONNAIRE - PHQ9
2. FEELING DOWN, DEPRESSED OR HOPELESS: 0
SUM OF ALL RESPONSES TO PHQ QUESTIONS 1-9: 0
SUM OF ALL RESPONSES TO PHQ QUESTIONS 1-9: 0
SUM OF ALL RESPONSES TO PHQ9 QUESTIONS 1 & 2: 0
SUM OF ALL RESPONSES TO PHQ QUESTIONS 1-9: 0
SUM OF ALL RESPONSES TO PHQ QUESTIONS 1-9: 0
1. LITTLE INTEREST OR PLEASURE IN DOING THINGS: 0

## 2022-05-31 NOTE — PROGRESS NOTES
Jon Ardon (:  1980) is a 39 y.o. male,Established patient, here for evaluation of the following chief complaint(s):  Rash (on abd )      ASSESSMENT/PLAN:    ICD-10-CM    1. Rash and nonspecific skin eruption  R21 methylPREDNISolone (MEDROL DOSEPACK) 4 MG tablet     triamcinolone (KENALOG) 0.1 % ointment   2. Class 3 severe obesity due to excess calories without serious comorbidity with body mass index (BMI) of 45.0 to 49.9 in adult [facility])  E66.01 Recommend diet and exercise    Z68.42        Return if symptoms worsen or fail to improve. SUBJECTIVE/OBJECTIVE:  HPI     RASH:  \"It's that skin thing I get every year. \"  \"My skin just comes alive and it itches. \"  Rash is on the abdomen. RECTAL BLEEDING:  Reports bright red bleeding last week. This lasted about 2-3 days. \"I used Tuck's pad and it has stopped. \"  Denies abdominal pain/rectal pain or bleeding. /84 (Site: Left Upper Arm, Position: Sitting, Cuff Size: Large Adult)   Pulse 89   Temp 98.9 °F (37.2 °C) (Temporal)   Ht 6' 1\" (1.854 m)   Wt (!) 357 lb (161.9 kg)   SpO2 99%   BMI 47.10 kg/m²     Review of Systems   Constitutional: Negative for chills, fatigue and fever. HENT: Positive for congestion (chronic) and rhinorrhea (chronic). Negative for ear pain and sore throat. Eyes: Negative for redness. Respiratory: Negative for cough and shortness of breath. Cardiovascular: Negative for chest pain. Gastrointestinal: Negative for constipation, diarrhea and vomiting. Skin: Positive for rash (abdomen). Neurological: Negative for dizziness and headaches. Physical Exam  Vitals reviewed. Constitutional:       Appearance: He is well-developed. He is obese. HENT:      Head: Normocephalic. Right Ear: Tympanic membrane, ear canal and external ear normal.      Left Ear: Tympanic membrane, ear canal and external ear normal.      Nose: Nose normal.   Eyes:      General:         Right eye: No discharge. Left eye: No discharge. Neck:      Vascular: No carotid bruit. Cardiovascular:      Rate and Rhythm: Normal rate and regular rhythm. Pulmonary:      Effort: Pulmonary effort is normal.      Breath sounds: Normal breath sounds. No wheezing, rhonchi or rales. Abdominal:      General: Bowel sounds are normal.      Palpations: Abdomen is soft. Musculoskeletal:         General: Normal range of motion. Cervical back: Normal range of motion. Lymphadenopathy:      Cervical: No cervical adenopathy. Skin:     General: Skin is dry. Findings: Rash (erythematous, urticarial, scattered, macular rash on the abdomen) present. Neurological:      General: No focal deficit present. Mental Status: He is alert and oriented to person, place, and time. Mental status is at baseline. Psychiatric:         Mood and Affect: Mood normal.         Behavior: Behavior normal.         Thought Content: Thought content normal.         Judgment: Judgment normal.             An electronic signature was used to authenticate this note.     --LASHONDA Hammonds

## 2022-06-01 DIAGNOSIS — T78.40XA RASH DUE TO ALLERGY: ICD-10-CM

## 2022-06-01 DIAGNOSIS — Z91.09 ENVIRONMENTAL ALLERGIES: ICD-10-CM

## 2022-06-01 DIAGNOSIS — R21 RASH DUE TO ALLERGY: ICD-10-CM

## 2022-06-01 RX ORDER — HYDROXYZINE 50 MG/1
TABLET, FILM COATED ORAL
Qty: 60 TABLET | Refills: 5 | Status: SHIPPED | OUTPATIENT
Start: 2022-06-01

## 2022-06-01 RX ORDER — TRAZODONE HYDROCHLORIDE 50 MG/1
TABLET ORAL
Qty: 60 TABLET | Refills: 5 | Status: SHIPPED | OUTPATIENT
Start: 2022-06-01

## 2022-06-01 NOTE — TELEPHONE ENCOUNTER
Requested Prescriptions     Pending Prescriptions Disp Refills    traZODone (DESYREL) 50 MG tablet [Pharmacy Med Name: TRAZODONE HCL 50 MG TABS 50 Tablet] 30 tablet 5     Sig: TAKE 1 TO 2 TABLETS BY MOUTH EVERY NIGHT AT BEDTIME **MAY MAKE DROWSY**       Last Office Visit: 6/8/2021  Next Office Visit: 6/14/2022  Last Medication Refill: 11/24/21 with 5 RF

## 2022-06-01 NOTE — TELEPHONE ENCOUNTER
Received fax from pharmacy requesting refill on pts medication(s). Pt was last seen in office on 5/31/2022  and has a follow up scheduled for Visit date not found. Will send request to  [facility]  for authorization.      Requested Prescriptions     Pending Prescriptions Disp Refills    hydrOXYzine (ATARAX) 50 MG tablet [Pharmacy Med Name: HYDROXYZINE HCL 50 MG TAB 50 Tablet] 60 tablet 5     Sig: TAKE ONE TABLET BY MOUTH EVERY SIX HOURS AS NEEDED FOR ANXIETY ** MAY CAUSE DROWSINESS **

## 2022-06-14 ENCOUNTER — OFFICE VISIT (OUTPATIENT)
Dept: NEUROLOGY | Age: 42
End: 2022-06-14
Payer: COMMERCIAL

## 2022-06-14 VITALS
HEART RATE: 71 BPM | HEIGHT: 73 IN | BODY MASS INDEX: 41.75 KG/M2 | SYSTOLIC BLOOD PRESSURE: 134 MMHG | WEIGHT: 315 LBS | DIASTOLIC BLOOD PRESSURE: 89 MMHG | RESPIRATION RATE: 22 BRPM

## 2022-06-14 DIAGNOSIS — G47.33 SLEEP APNEA, OBSTRUCTIVE: Primary | ICD-10-CM

## 2022-06-14 PROCEDURE — 99213 OFFICE O/P EST LOW 20 MIN: CPT | Performed by: PSYCHIATRY & NEUROLOGY

## 2022-06-14 NOTE — PROGRESS NOTES
[facility]  [address] [place] [address] Drive, [address], [address]  Phone [phone]  Fax [phone]     [address] Follow Up Encounter  22 2:25 PM CDT    Information:   Patient Name: Dominique Dutton  :   1980  Age:   39 y.o. MRN:   [id number]  Account #:  [de-identified]  Today:  22    Provider: Tulio Lester M.D. Chief Complaint:   Chief Complaint   Patient presents with    Follow-up    Sleep Apnea       Subjective:   Dominique Dutton is a 39 y.o. man with a history of obstructive sleep apnea who is following up. He is finally using his CPAP well. He uses it every night. He is resting better. He has had no problems with the mask or pressure and has gotten into the habit of putting it on nightly. Objective:     Past Medical History:  Past Medical History:   Diagnosis Date    Hypertension        Past Surgical History:   Procedure Laterality Date    GASTRIC BYPASS SURGERY         Recent Hospitalizations  None    Significant Injuries  None    Habits  Dominique Dutton reports that he has never smoked. He has never used smokeless tobacco. He reports current alcohol use. He reports that he does not use drugs. History reviewed. No pertinent family history. Social History  Dominique Dutton is single, lives in [place], [place], and works for [employer]. Medications:  Current Outpatient Medications   Medication Sig Dispense Refill    traZODone (DESYREL) 50 MG tablet TAKE 1 TO 2 TABLETS BY MOUTH EVERY NIGHT AT BEDTIME **MAY MAKE DROWSY** 60 tablet 5    hydrOXYzine (ATARAX) 50 MG tablet TAKE ONE TABLET BY MOUTH EVERY SIX HOURS AS NEEDED FOR ANXIETY ** MAY CAUSE DROWSINESS ** 60 tablet 5    mupirocin (BACTROBAN) 2 % ointment Apply topically 3 times daily.  22 g 1    hydrocortisone (ANUSOL-HC) 25 MG suppository Place 1 suppository rectally 2 times daily as needed for Hemorrhoids 12 suppository 1    B-D ULTRAFINE III SHORT PEN 31G X 8 MM MISC USE WITH SAXENDA DAILY 100 each 1    montelukast (SINGULAIR) 10 MG tablet Take 1 tablet by mouth nightly 90 tablet 3    cetirizine (ZYRTEC) 10 MG tablet Take 1 tablet by mouth daily 90 tablet 3    pantoprazole (PROTONIX) 40 MG tablet Take 1 tablet by mouth 2 times daily 60 tablet 11    fluticasone (FLONASE) 50 MCG/ACT nasal spray 2 sprays by Nasal route daily 3 Bottle 3    epinastine (ELESTAT) 0.05 % SOLN PLACE ONE DROP INTO BOTH EYES TWICE A DAY. 5 mL 1    nystatin (MYCOSTATIN) 653613 UNIT/GM powder Apply topically 3 times daily Apply 3 times daily. 1 Bottle 3    EPINEPHrine (EPIPEN 2-KEIKO) 0.3 MG/0.3ML SOAJ injection Inject 0.3 mLs into the skin once for 1 dose Use as directed for allergic reaction 0.3 mL 3     No current facility-administered medications for this visit. Allergies:   Allergies as of 06/14/2022 - Fully Reviewed 06/14/2022   Allergen Reaction Noted    Cat hair extract Anaphylaxis 08/08/2017    Dog epithelium Anaphylaxis 08/08/2017    Penicillins Anaphylaxis 05/06/2014    Meat extract  01/02/2019       Review of Systems:  Constitutional: negative for - chills and fever  Eyes:  negative for - visual disturbance and photophobia  HENMT: negative for - headaches and sinus pain  Respiratory: negative for - cough, hemoptysis, and shortness of breath  Cardiovascular: negative for - chest pain and palpitations  Gastrointestinal: negative for - blood in stools, constipation, diarrhea, nausea, and vomiting  Genito-Urinary: negative for - hematuria, urinary frequency, urinary urgency, and urinary retention  Musculoskeletal: negative for - joint pain, joint stiffness, and joint swelling  Hematological and Lymphatic: negative for - bleeding problems, abnormal bruising, and swollen lymph nodes  Endocrine:  negative for - polydipsia and polyphagia  Allergy/Immunology:  negative for - rhinorrhea, sinus congestion, hives  Integument:  negative for - negative for - rash, change in moles, new or changing lesions  Psychological: negative for - anxiety and depression  Neurological: negative for - memory loss, numbness/tingling, and weakness     PHYSICAL EXAMINATION:  Vitals:  /89   Pulse 71   Resp 22   Ht 6' 1\" (1.854 m)   Wt (!) 345 lb (156.5 kg)   BMI 45.52 kg/m²   General appearance:  Alert, well developed, well nourished, in no distress  HEENT:  normocephalic, atraumatic, sclera appear normal, no nasal abnormalities, no rhinorrhea, Ears appear normal, oral mucous membranes are moist without erythema, trachea midline, thyroid is normal, no lymphadenopathy or neck mass. IV (only hard palate visible). Cardiovascular:  Regular rate and rhythm without murmer. No peripheral edema, No cyanosis or clubbing. No carotid bruits. Pulmonary:  Lungs are clear to auscultation. Breathing appears normal, good expansion, normal effort without use of accessory muscles  Musculoskeletal:  Joints are normal.  No splints, slings, or casts. Spine appears normal with normal posture and range of motion. Integument:  No rash, erythema, or pallor  Psychiatric:  Mood, affect, and behavior appear normal      NEUROLOGIC EXAMINATION:  Mental Status:  alert, oriented to person, place, and time. Speech:  Clear without dysarthria or dysphonia  Language:  Fluent without aphasia  Cranial Nerves:   II Visual fields are full to confrontation   III,IV, VI Extraocular movements are full   VII Facial movements are symmetrical without weakness   VIII Hearing is intact   XII No tongue atrophy or fasciculations. Normal tongue protrusion. No tongue weakness  Motor:  Normal strength in both upper and lower extremities. Normal muscle tone and bulk. Deep tendon reflexes are intact and symmetrical in both upper and lower extremities. Jarrett's signs are absent bilaterally. There is no ankle clonus on either side. Sensation:  Sensation is intact to light touch and vibration in all extremities.   Coordination:  Rapid alternating movements

## 2022-07-07 DIAGNOSIS — K21.9 GASTROESOPHAGEAL REFLUX DISEASE: ICD-10-CM

## 2022-07-07 RX ORDER — PANTOPRAZOLE SODIUM 40 MG/1
40 TABLET, DELAYED RELEASE ORAL 2 TIMES DAILY
Qty: 180 TABLET | Refills: 3 | Status: SHIPPED | OUTPATIENT
Start: 2022-07-07

## 2022-07-07 NOTE — TELEPHONE ENCOUNTER
Received fax from pharmacy requesting refill on pts medication(s). Pt was last seen in office on 5/31/2022  and has a follow up scheduled for Visit date not found. Will send request to  [facility]  for patient.      Requested Prescriptions     Pending Prescriptions Disp Refills    pantoprazole (PROTONIX) 40 MG tablet [Pharmacy Med Name: PANTOPRAZOLE SOD DR 40 MG T 40 Tablet] 60 tablet 11     Sig: TAKE ONE TABLET BY MOUTH TWICE A DAY

## 2022-08-05 DIAGNOSIS — Z91.09 ENVIRONMENTAL ALLERGIES: ICD-10-CM

## 2022-08-05 RX ORDER — CETIRIZINE HYDROCHLORIDE 10 MG/1
10 TABLET ORAL DAILY
Qty: 90 TABLET | Refills: 3 | Status: SHIPPED | OUTPATIENT
Start: 2022-08-05

## 2022-08-05 NOTE — TELEPHONE ENCOUNTER
Received fax from pharmacy requesting refill on pts medication(s). Pt was last seen in office on 5/31/2022  and has a follow up scheduled for Visit date not found. Will send request to  [facility]  for authorization.      Requested Prescriptions     Pending Prescriptions Disp Refills    cetirizine (ZYRTEC) 10 MG tablet [Pharmacy Med Name: CETIRIZINE HCL 10 MG TAB 10 Tablet] 90 tablet 3     Sig: TAKE ONE TABLET BY MOUTH DAILY

## 2022-12-29 DIAGNOSIS — R21 RASH DUE TO ALLERGY: ICD-10-CM

## 2022-12-29 DIAGNOSIS — T78.40XA RASH DUE TO ALLERGY: ICD-10-CM

## 2022-12-29 DIAGNOSIS — Z91.09 ENVIRONMENTAL ALLERGIES: ICD-10-CM

## 2022-12-30 RX ORDER — TRAZODONE HYDROCHLORIDE 50 MG/1
TABLET ORAL
Qty: 60 TABLET | Refills: 5 | Status: SHIPPED | OUTPATIENT
Start: 2022-12-30

## 2022-12-30 RX ORDER — HYDROXYZINE 50 MG/1
TABLET, FILM COATED ORAL
Qty: 60 TABLET | Refills: 5 | Status: SHIPPED | OUTPATIENT
Start: 2022-12-30

## 2022-12-30 NOTE — TELEPHONE ENCOUNTER
Requested Prescriptions     Pending Prescriptions Disp Refills    traZODone (DESYREL) 50 MG tablet [Pharmacy Med Name: TRAZODONE HCL 50 MG TABS 50 Tablet] 60 tablet 5     Sig: TAKE 1 TO 2 TABLETS BY MOUTH EVERY NIGHT AT BEDTIME **MAY MAKE DROWSY**       Last Office Visit: 6/14/2022  Next Office Visit: 6/15/2023  Last Medication Refill: 6/1/2022 with 5 RF

## 2022-12-30 NOTE — TELEPHONE ENCOUNTER
Received fax from pharmacy requesting refill on pts medication(s). Pt was last seen in office on 5/31/2022  and has a follow up scheduled for Visit date not found. Will send request to  [facility]  for authorization.      Requested Prescriptions     Pending Prescriptions Disp Refills    hydrOXYzine HCl (ATARAX) 50 MG tablet [Pharmacy Med Name: HYDROXYZINE HCL 50 MG TABS 50 Tablet] 60 tablet 5     Sig: TAKE ONE TABLET BY MOUTH EVERY SIX HOURS AS NEEDED FOR ANXIETY ** MAY CAUSE DROWSINESS **

## 2023-01-25 ENCOUNTER — PATIENT MESSAGE (OUTPATIENT)
Dept: FAMILY MEDICINE CLINIC | Age: 43
End: 2023-01-25

## 2023-01-25 ENCOUNTER — TELEMEDICINE (OUTPATIENT)
Dept: FAMILY MEDICINE CLINIC | Age: 43
End: 2023-01-25
Payer: COMMERCIAL

## 2023-01-25 DIAGNOSIS — J01.90 ACUTE BACTERIAL SINUSITIS: Primary | ICD-10-CM

## 2023-01-25 DIAGNOSIS — B96.89 ACUTE BACTERIAL SINUSITIS: Primary | ICD-10-CM

## 2023-01-25 PROCEDURE — 99213 OFFICE O/P EST LOW 20 MIN: CPT | Performed by: NURSE PRACTITIONER

## 2023-01-25 RX ORDER — METHYLPREDNISOLONE 4 MG/1
TABLET ORAL
Qty: 1 KIT | Refills: 0 | Status: SHIPPED | OUTPATIENT
Start: 2023-01-25 | End: 2023-01-31

## 2023-01-25 RX ORDER — AZITHROMYCIN 250 MG/1
250 TABLET, FILM COATED ORAL SEE ADMIN INSTRUCTIONS
Qty: 6 TABLET | Refills: 0 | Status: SHIPPED | OUTPATIENT
Start: 2023-01-25 | End: 2023-01-30

## 2023-01-25 ASSESSMENT — ENCOUNTER SYMPTOMS
COUGH: 1
SORE THROAT: 0
RHINORRHEA: 1
SINUS PRESSURE: 1

## 2023-01-25 NOTE — PROGRESS NOTES
Adela Roche (:  1980) is a Established patient, here for evaluation of the following: Sinus problem    DOXY. ME    Assessment & Plan   Below is the assessment and plan developed based on review of pertinent history, physical exam, labs, studies, and medications. 1. Acute bacterial sinusitis  -     azithromycin (ZITHROMAX) 250 MG tablet; Take 1 tablet by mouth See Admin Instructions for 5 days 500mg on day 1 followed by 250mg on days 2 - 5, Disp-6 tablet, R-0Normal  -     methylPREDNISolone (MEDROL DOSEPACK) 4 MG tablet; Take by mouth., Disp-1 kit, R-0 Normal  - Continue Flonase  - Continue Singulair 10 mg & Zyrtec 10 mg  Return if symptoms worsen or fail to improve. Subjective   HPI    SINUS:  Reports nasal congestion and drainage. Reports cough. Reports PND  Reports sinus pressure  Denies N, V or D. Denies sore throat  Denies fever  \"I started my usual Mucinex. \"  Symptoms started a few days ago. \"It is my usual sinus stuff. \"    Review of Systems   Constitutional:  Negative for fever. HENT:  Positive for congestion, postnasal drip, rhinorrhea and sinus pressure. Negative for sore throat. Respiratory:  Positive for cough. Neurological:  Positive for headaches (sinus pressure headache).         Objective   Patient-Reported Vitals  No data recorded     Physical Exam  [INSTRUCTIONS:  \"[x]\" Indicates a positive item  \"[]\" Indicates a negative item  -- DELETE ALL ITEMS NOT EXAMINED]    Constitutional: [x] Appears well-developed and well-nourished [x] No apparent distress      [x] Abnormal - obese    Mental status: [x] Alert and awake  [x] Oriented to person/place/time [x] Able to follow commands    [] Abnormal -     Eyes:   EOM    [x]  Normal    [] Abnormal -   Sclera  [x]  Normal    [] Abnormal -          Discharge []  None visible   [] Abnormal -     HENT: [x] Normocephalic, atraumatic  [] Abnormal -   [x] Mouth/Throat: Mucous membranes are moist    External Ears [x] Normal  [] Abnormal -    Neck: [x] No visualized mass [] Abnormal -     Pulmonary/Chest: [x] Respiratory effort normal   [x] No visualized signs of difficulty breathing or respiratory distress        [] Abnormal -      Musculoskeletal:   [x] Normal gait with no signs of ataxia         [x] Normal range of motion of neck        [] Abnormal -     Neurological:        [x] No Facial Asymmetry (Cranial nerve 7 motor function) (limited exam due to video visit)          [x] No gaze palsy        [] Abnormal -          Skin:        [x] No significant exanthematous lesions or discoloration noted on facial skin         [] Abnormal -            Psychiatric:       [x] Normal Affect [] Abnormal -        [x] No Hallucinations    Other pertinent observable physical exam findings:-             João Briones, was evaluated through a synchronous (real-time) audio-video encounter. The patient (or guardian if applicable) is aware that this is a billable service, which includes applicable co-pays. This Virtual Visit was conducted with patient's (and/or legal guardian's) consent. The visit was conducted pursuant to the emergency declaration under the Calder[place] Act and the National Emergencies Act, 1135 waiver authority and the Coronavirus Preparedness and Response Supplemental Appropriations Act.  Patient identification was verified, and a caregiver was present when appropriate.   The patient was located at Home: [address] [place] [address] [place] [address].   Provider was located at Facility (Appt Dept): [address] [place] [address],  [place] [address].        --LASHONDA Arrieta

## 2023-03-01 DIAGNOSIS — Z91.09 ENVIRONMENTAL ALLERGIES: ICD-10-CM

## 2023-03-01 RX ORDER — MONTELUKAST SODIUM 10 MG/1
10 TABLET ORAL NIGHTLY
Qty: 90 TABLET | Refills: 3 | Status: SHIPPED | OUTPATIENT
Start: 2023-03-01

## 2023-06-02 ENCOUNTER — TELEPHONE (OUTPATIENT)
Dept: FAMILY MEDICINE CLINIC | Age: 43
End: 2023-06-02

## 2023-06-02 ENCOUNTER — OFFICE VISIT (OUTPATIENT)
Dept: FAMILY MEDICINE CLINIC | Age: 43
End: 2023-06-02
Payer: COMMERCIAL

## 2023-06-02 VITALS
WEIGHT: 315 LBS | DIASTOLIC BLOOD PRESSURE: 80 MMHG | BODY MASS INDEX: 41.75 KG/M2 | SYSTOLIC BLOOD PRESSURE: 126 MMHG | HEIGHT: 73 IN | OXYGEN SATURATION: 97 % | TEMPERATURE: 97.1 F | HEART RATE: 68 BPM

## 2023-06-02 DIAGNOSIS — Z00.00 ENCOUNTER FOR WELL ADULT EXAM WITHOUT ABNORMAL FINDINGS: Primary | ICD-10-CM

## 2023-06-02 DIAGNOSIS — Z91.09 ENVIRONMENTAL ALLERGIES: ICD-10-CM

## 2023-06-02 DIAGNOSIS — R21 RASH DUE TO ALLERGY: ICD-10-CM

## 2023-06-02 DIAGNOSIS — B37.9 YEAST INFECTION: ICD-10-CM

## 2023-06-02 DIAGNOSIS — K21.9 GASTROESOPHAGEAL REFLUX DISEASE, UNSPECIFIED WHETHER ESOPHAGITIS PRESENT: ICD-10-CM

## 2023-06-02 DIAGNOSIS — Z00.00 ENCOUNTER FOR WELL ADULT EXAM WITHOUT ABNORMAL FINDINGS: ICD-10-CM

## 2023-06-02 DIAGNOSIS — E66.01 CLASS 3 SEVERE OBESITY DUE TO EXCESS CALORIES WITHOUT SERIOUS COMORBIDITY WITH BODY MASS INDEX (BMI) OF 45.0 TO 49.9 IN ADULT (HCC): ICD-10-CM

## 2023-06-02 DIAGNOSIS — T78.40XD ALLERGIC REACTION, SUBSEQUENT ENCOUNTER: ICD-10-CM

## 2023-06-02 DIAGNOSIS — F51.01 PRIMARY INSOMNIA: ICD-10-CM

## 2023-06-02 DIAGNOSIS — G47.33 OBSTRUCTIVE SLEEP APNEA SYNDROME: ICD-10-CM

## 2023-06-02 DIAGNOSIS — T78.40XA RASH DUE TO ALLERGY: ICD-10-CM

## 2023-06-02 LAB
ALBUMIN SERPL-MCNC: 4.3 G/DL (ref 3.5–5.2)
ALP SERPL-CCNC: 101 U/L (ref 40–130)
ALT SERPL-CCNC: 20 U/L (ref 5–41)
ANION GAP SERPL CALCULATED.3IONS-SCNC: 13 MMOL/L (ref 7–19)
AST SERPL-CCNC: 22 U/L (ref 5–40)
BASOPHILS # BLD: 0 K/UL (ref 0–0.2)
BASOPHILS NFR BLD: 0.7 % (ref 0–1)
BILIRUB SERPL-MCNC: 0.5 MG/DL (ref 0.2–1.2)
BUN SERPL-MCNC: 11 MG/DL (ref 6–20)
CALCIUM SERPL-MCNC: 8.8 MG/DL (ref 8.6–10)
CHLORIDE SERPL-SCNC: 101 MMOL/L (ref 98–111)
CHOLEST SERPL-MCNC: 183 MG/DL (ref 160–199)
CO2 SERPL-SCNC: 24 MMOL/L (ref 22–29)
CREAT SERPL-MCNC: 0.7 MG/DL (ref 0.5–1.2)
EOSINOPHIL # BLD: 0.1 K/UL (ref 0–0.6)
EOSINOPHIL NFR BLD: 3.3 % (ref 0–5)
ERYTHROCYTE [DISTWIDTH] IN BLOOD BY AUTOMATED COUNT: 14.1 % (ref 11.5–14.5)
GLUCOSE SERPL-MCNC: 84 MG/DL (ref 74–109)
HCT VFR BLD AUTO: 41.9 % (ref 42–52)
HDLC SERPL-MCNC: 56 MG/DL (ref 55–121)
HGB BLD-MCNC: 13.3 G/DL (ref 14–18)
IMM GRANULOCYTES # BLD: 0 K/UL
LDLC SERPL CALC-MCNC: 95 MG/DL
LYMPHOCYTES # BLD: 1.1 K/UL (ref 1.1–4.5)
LYMPHOCYTES NFR BLD: 26.3 % (ref 20–40)
MCH RBC QN AUTO: 27 PG (ref 27–31)
MCHC RBC AUTO-ENTMCNC: 31.7 G/DL (ref 33–37)
MCV RBC AUTO: 85.2 FL (ref 80–94)
MONOCYTES # BLD: 0.3 K/UL (ref 0–0.9)
MONOCYTES NFR BLD: 7.9 % (ref 0–10)
NEUTROPHILS # BLD: 2.7 K/UL (ref 1.5–7.5)
NEUTS SEG NFR BLD: 61.6 % (ref 50–65)
PLATELET # BLD AUTO: 222 K/UL (ref 130–400)
PMV BLD AUTO: 10.6 FL (ref 9.4–12.4)
POTASSIUM SERPL-SCNC: 4.3 MMOL/L (ref 3.5–5)
PROT SERPL-MCNC: 6.9 G/DL (ref 6.6–8.7)
RBC # BLD AUTO: 4.92 M/UL (ref 4.7–6.1)
SODIUM SERPL-SCNC: 138 MMOL/L (ref 136–145)
T4 FREE SERPL-MCNC: 1.24 NG/DL (ref 0.93–1.7)
TRIGL SERPL-MCNC: 158 MG/DL (ref 0–149)
TSH SERPL DL<=0.005 MIU/L-ACNC: 0.89 UIU/ML (ref 0.27–4.2)
WBC # BLD AUTO: 4.3 K/UL (ref 4.8–10.8)

## 2023-06-02 PROCEDURE — 99396 PREV VISIT EST AGE 40-64: CPT | Performed by: NURSE PRACTITIONER

## 2023-06-02 RX ORDER — NYSTATIN 100000 [USP'U]/G
POWDER TOPICAL 3 TIMES DAILY
Qty: 1 EACH | Refills: 3 | Status: SHIPPED | OUTPATIENT
Start: 2023-06-02

## 2023-06-02 RX ORDER — TRAZODONE HYDROCHLORIDE 50 MG/1
TABLET ORAL
Qty: 60 TABLET | Refills: 11 | Status: SHIPPED | OUTPATIENT
Start: 2023-06-02

## 2023-06-02 RX ORDER — EPINEPHRINE 0.3 MG/.3ML
0.3 INJECTION SUBCUTANEOUS ONCE
Qty: 0.3 ML | Refills: 3 | Status: SHIPPED | OUTPATIENT
Start: 2023-06-02 | End: 2023-06-02

## 2023-06-02 RX ORDER — HYDROXYZINE 50 MG/1
TABLET, FILM COATED ORAL
Qty: 60 TABLET | Refills: 11 | Status: SHIPPED | OUTPATIENT
Start: 2023-06-02

## 2023-06-02 RX ORDER — CETIRIZINE HYDROCHLORIDE 10 MG/1
10 TABLET ORAL DAILY
Qty: 90 TABLET | Refills: 3 | Status: SHIPPED | OUTPATIENT
Start: 2023-06-02

## 2023-06-02 RX ORDER — PANTOPRAZOLE SODIUM 40 MG/1
40 TABLET, DELAYED RELEASE ORAL 2 TIMES DAILY
Qty: 180 TABLET | Refills: 3 | Status: SHIPPED | OUTPATIENT
Start: 2023-06-02

## 2023-06-02 RX ORDER — FLUTICASONE PROPIONATE 50 MCG
2 SPRAY, SUSPENSION (ML) NASAL DAILY
Qty: 3 EACH | Refills: 3 | Status: SHIPPED | OUTPATIENT
Start: 2023-06-02

## 2023-06-02 RX ORDER — SEMAGLUTIDE 0.25 MG/.5ML
0.25 INJECTION, SOLUTION SUBCUTANEOUS
Qty: 2 ML | Refills: 0 | Status: SHIPPED | OUTPATIENT
Start: 2023-06-02 | End: 2023-07-02

## 2023-06-02 ASSESSMENT — PATIENT HEALTH QUESTIONNAIRE - PHQ9
SUM OF ALL RESPONSES TO PHQ QUESTIONS 1-9: 0
SUM OF ALL RESPONSES TO PHQ QUESTIONS 1-9: 0
1. LITTLE INTEREST OR PLEASURE IN DOING THINGS: 0
SUM OF ALL RESPONSES TO PHQ QUESTIONS 1-9: 0
2. FEELING DOWN, DEPRESSED OR HOPELESS: 0
SUM OF ALL RESPONSES TO PHQ9 QUESTIONS 1 & 2: 0
2. FEELING DOWN, DEPRESSED OR HOPELESS: NOT AT ALL
SUM OF ALL RESPONSES TO PHQ QUESTIONS 1-9: 0
SUM OF ALL RESPONSES TO PHQ9 QUESTIONS 1 & 2: 0
1. LITTLE INTEREST OR PLEASURE IN DOING THINGS: NOT AT ALL

## 2023-06-02 ASSESSMENT — ENCOUNTER SYMPTOMS
SORE THROAT: 0
EYE REDNESS: 0
RHINORRHEA: 0
ABDOMINAL PAIN: 0
COUGH: 0
VOMITING: 0
SHORTNESS OF BREATH: 0
DIARRHEA: 0
CONSTIPATION: 0

## 2023-06-02 NOTE — PATIENT INSTRUCTIONS
to be effective rewards. Treat yourself immediately after making better eating choices to reinforce the value of the good behavior. You need to have clear behavior goals, and you must have a time frame for reaching your goals. Reward small changes along the way to your final goal.  Other factors that contribute to successful weight loss -- Changing your behavior involves more than just changing unhealthy eating habits; it also involves finding people around you to support your weight loss, reducing stress, and learning to be strong when tempted by food. Establish a \"minerva\" system -- Having a friend or family member available to provide support and reinforce good behavior is very helpful. The support person needs to understand your goals. Learn to be strong -- Learning to be strong when tempted by food is an important part of losing weight. As an example, you will need to learn how to say \"no\" and continue to say no when urged to eat at parties and social gatherings. Develop strategies for events before you go, such as eating before you go or taking low-calorie snacks and drinks with you. Develop a support system -- Having a support system is helpful when losing weight. This is why many [employer] groups are successful. Family support is also essential; if your family does not support your efforts to lose weight, this can slow your progress or even keep you from losing weight. Positive thinking -- People often have conversations with themselves in their head; these conversations can be positive or negative. If you eat a piece of cake that was not planned, you may respond by thinking, \"Oh, you stupid idiot, you've blown your diet! \" and as a result, you may eat more cake. A positive thought for the same event could be, \"Well, I ate cake when it was not on my plan. Now I should do something to get back on track. \" A positive approach is much more likely to be successful than a negative one.   Reduce stress --

## 2023-06-02 NOTE — PROGRESS NOTES
Well Adult Note  Name: Mira Villasenor Date: 2023   MRN: [id number] Sex: Male   Age: 43 y.o. Ethnicity: Non- / Non    : 1980 Race: White (non-)      Fei Ware is here for well adult exam.  History:    Eyes: Yes  Dentist:  No, \"I got it rescheduled\"  Skin:  No concerns  Labs:  Due  PSA: No  Nocturia:  \"Sometimes\"  Stream: Steady  Colonoscopy:  Denies any blood in stool  Exercise: Yes  \"I am walking and working in my garden. \"  Diet and Nut:  \"Low carb, low calorie\"  MVI:  No  Supplements:  Elderberry  Asa: No  Depression:  No concerns  Body Image: Overweight  EOL:  No  Tobacco: No   Substance: ETOH 4-5 beers a week  Sleep: No concerns  \"I have not been using my machine. \"  \"I don't have supplies. \"  He has been out of his supplies for months. He takes Trazodone and hydroxzine at night. Cognition: No concerns      Review of Systems   Constitutional:  Negative for chills, fatigue and fever. HENT:  Negative for congestion, ear pain, rhinorrhea and sore throat. Eyes:  Negative for redness. Respiratory:  Negative for cough and shortness of breath. Cardiovascular:  Negative for chest pain. Gastrointestinal:  Negative for abdominal pain, constipation, diarrhea and vomiting. BELEM is well controlled   Genitourinary:  Negative for dysuria and urgency. Skin:  Negative for rash. Neurological:  Negative for dizziness and headaches. Psychiatric/Behavioral:  Negative for sleep disturbance (controlled on current regimen). The patient is not nervous/anxious. Allergies   Allergen Reactions    Cat Hair Extract Anaphylaxis    Dog Epithelium Anaphylaxis    Penicillins Anaphylaxis    Meat Extract          Prior to Visit Medications    Medication Sig Taking? Authorizing Provider   mupirocin (BACTROBAN) 2 % ointment Apply topically 3 times daily. Yes LASHONDA Arrieta   nystatin (MYCOSTATIN) 361432 UNIT/GM powder Apply topically 3 times daily Apply 3 times daily.

## 2023-06-05 ENCOUNTER — TELEPHONE (OUTPATIENT)
Dept: FAMILY MEDICINE CLINIC | Age: 43
End: 2023-06-05

## 2023-06-05 NOTE — TELEPHONE ENCOUNTER
----- Message from LASHONDA Erickson sent at 6/2/2023  4:12 PM CDT -----  Cholesterol is well controlled  Thyroid is WNL  CMP: WNL, this includes normal electrolytes, kidney and liver fxn

## 2023-08-02 ENCOUNTER — OFFICE VISIT (OUTPATIENT)
Dept: FAMILY MEDICINE CLINIC | Age: 43
End: 2023-08-02
Payer: COMMERCIAL

## 2023-08-02 VITALS
OXYGEN SATURATION: 98 % | SYSTOLIC BLOOD PRESSURE: 110 MMHG | HEART RATE: 75 BPM | HEIGHT: 73 IN | TEMPERATURE: 96.9 F | WEIGHT: 315 LBS | BODY MASS INDEX: 41.75 KG/M2 | DIASTOLIC BLOOD PRESSURE: 80 MMHG

## 2023-08-02 DIAGNOSIS — E66.01 CLASS 3 SEVERE OBESITY DUE TO EXCESS CALORIES WITHOUT SERIOUS COMORBIDITY WITH BODY MASS INDEX (BMI) OF 45.0 TO 49.9 IN ADULT (HCC): Primary | ICD-10-CM

## 2023-08-02 DIAGNOSIS — R10.9 LEFT FLANK PAIN: ICD-10-CM

## 2023-08-02 LAB
BILIRUBIN, POC: NORMAL
BLOOD URINE, POC: NORMAL
CLARITY, POC: CLEAR
COLOR, POC: YELLOW
GLUCOSE URINE, POC: NORMAL
KETONES, POC: NORMAL
LEUKOCYTE EST, POC: NORMAL
NITRITE, POC: NORMAL
PH, POC: 7
PROTEIN, POC: NORMAL
SPECIFIC GRAVITY, POC: 1.01
UROBILINOGEN, POC: 0.2

## 2023-08-02 PROCEDURE — 99213 OFFICE O/P EST LOW 20 MIN: CPT | Performed by: NURSE PRACTITIONER

## 2023-08-02 PROCEDURE — 81002 URINALYSIS NONAUTO W/O SCOPE: CPT | Performed by: NURSE PRACTITIONER

## 2023-08-02 ASSESSMENT — ENCOUNTER SYMPTOMS
SORE THROAT: 0
SHORTNESS OF BREATH: 0
ABDOMINAL PAIN: 0
CONSTIPATION: 0
RHINORRHEA: 0
EYE REDNESS: 0
VOMITING: 0
DIARRHEA: 0
COUGH: 0

## 2023-08-22 ENCOUNTER — PATIENT MESSAGE (OUTPATIENT)
Dept: NEUROLOGY | Age: 43
End: 2023-08-22

## 2023-08-22 DIAGNOSIS — F51.01 PRIMARY INSOMNIA: ICD-10-CM

## 2023-08-22 RX ORDER — TRAZODONE HYDROCHLORIDE 50 MG/1
TABLET ORAL
Qty: 60 TABLET | Refills: 11 | OUTPATIENT
Start: 2023-08-22

## 2023-08-22 NOTE — TELEPHONE ENCOUNTER
Sent pt a message informing him of his PCP sending refill for Trazadone on 6/2/23 with 11 refills. I denied the refill request sent to our office today 8/22/23 due to already having medication available.

## 2023-10-20 DIAGNOSIS — F51.01 PRIMARY INSOMNIA: ICD-10-CM

## 2023-10-23 RX ORDER — TRAZODONE HYDROCHLORIDE 50 MG/1
TABLET ORAL
Qty: 60 TABLET | Refills: 5 | Status: SHIPPED | OUTPATIENT
Start: 2023-10-23

## 2023-10-23 NOTE — TELEPHONE ENCOUNTER
Requested Prescriptions     Pending Prescriptions Disp Refills    traZODone (DESYREL) 50 MG tablet [Pharmacy Med Name: TRAZODONE 50 MG TABLET] 60 tablet 5     Sig: TAKE 1 TO 2 TABLETS BY MOUTH EVERY NIGHT AT BEDTIME, MAY CAUSE DROWSINESS       Last Office Visit: 6/15/2023  Next Office Visit: Visit date not found  Last Medication Refill: 12/30/22 with 5 RF      Script from 12/30/22 was originally sent to [address] [place] [address] but transferred to [facility]. It is out of refills. I am showing another script from [facility] that was sent to [address] [place] [address] on 6/2/23 with 11 RF but transferred out to [facility] as well. When I spoke with [facility] they are not showing any record of script from the other provider and the script from our office is out of refills.  I have t'd up the script to be sent to [facility].

## 2023-10-25 DIAGNOSIS — E66.01 CLASS 3 SEVERE OBESITY DUE TO EXCESS CALORIES WITHOUT SERIOUS COMORBIDITY WITH BODY MASS INDEX (BMI) OF 45.0 TO 49.9 IN ADULT (HCC): Primary | ICD-10-CM

## 2023-10-25 RX ORDER — SEMAGLUTIDE 1 MG/.5ML
1 INJECTION, SOLUTION SUBCUTANEOUS
Qty: 0.5 ML | Refills: 1 | Status: SHIPPED | OUTPATIENT
Start: 2023-10-25

## 2023-12-30 DIAGNOSIS — F51.01 PRIMARY INSOMNIA: ICD-10-CM

## 2023-12-30 DIAGNOSIS — Z91.09 ENVIRONMENTAL ALLERGIES: ICD-10-CM

## 2023-12-30 DIAGNOSIS — R21 RASH DUE TO ALLERGY: ICD-10-CM

## 2023-12-30 DIAGNOSIS — T78.40XA RASH DUE TO ALLERGY: ICD-10-CM

## 2024-01-02 RX ORDER — HYDROXYZINE 50 MG/1
50 TABLET, FILM COATED ORAL EVERY 6 HOURS PRN
Qty: 360 TABLET | Refills: 1 | OUTPATIENT
Start: 2024-01-02

## 2024-01-02 NOTE — TELEPHONE ENCOUNTER
Received fax from pharmacy requesting refill on pts medication(s). Pt was last seen in office on 8/2/2023  and has a follow up scheduled for Visit date not found.  This was sent to [facility] on 6/2/23 with 11 refills. Refill not appropriate.     Requested Prescriptions     Refused Prescriptions Disp Refills    hydrOXYzine HCl (ATARAX) 50 MG tablet [Pharmacy Med Name: HYDROXYZINE HCL 50 MG TABLET] 360 tablet 1     Sig: Take 1 tablet by mouth every 6 hours as needed for Anxiety TAKE 1 TABLET BY MOUTH EVERY 6 HOURS AS NEEDED FOR ANXIETY **MAY MAKE DROWSY**     Refused By: JAMIE DEAN     Reason for Refusal: Refill not appropriate

## 2024-01-16 ENCOUNTER — TELEPHONE (OUTPATIENT)
Dept: FAMILY MEDICINE CLINIC | Age: 44
End: 2024-01-16

## 2024-01-16 ENCOUNTER — OFFICE VISIT (OUTPATIENT)
Dept: FAMILY MEDICINE CLINIC | Age: 44
End: 2024-01-16
Payer: COMMERCIAL

## 2024-01-16 VITALS
HEIGHT: 73 IN | BODY MASS INDEX: 41.75 KG/M2 | TEMPERATURE: 97.6 F | WEIGHT: 315 LBS | SYSTOLIC BLOOD PRESSURE: 138 MMHG | HEART RATE: 91 BPM | DIASTOLIC BLOOD PRESSURE: 88 MMHG | OXYGEN SATURATION: 98 %

## 2024-01-16 DIAGNOSIS — E66.01 CLASS 3 SEVERE OBESITY DUE TO EXCESS CALORIES WITHOUT SERIOUS COMORBIDITY WITH BODY MASS INDEX (BMI) OF 45.0 TO 49.9 IN ADULT (HCC): ICD-10-CM

## 2024-01-16 DIAGNOSIS — J02.9 ACUTE PHARYNGITIS, UNSPECIFIED ETIOLOGY: Primary | ICD-10-CM

## 2024-01-16 DIAGNOSIS — E66.01 CLASS 3 SEVERE OBESITY DUE TO EXCESS CALORIES WITH SERIOUS COMORBIDITY AND BODY MASS INDEX (BMI) OF 45.0 TO 49.9 IN ADULT (HCC): ICD-10-CM

## 2024-01-16 DIAGNOSIS — J02.9 SORE THROAT: ICD-10-CM

## 2024-01-16 DIAGNOSIS — J02.9 ACUTE PHARYNGITIS, UNSPECIFIED ETIOLOGY: ICD-10-CM

## 2024-01-16 PROCEDURE — 99213 OFFICE O/P EST LOW 20 MIN: CPT | Performed by: NURSE PRACTITIONER

## 2024-01-16 RX ORDER — AZITHROMYCIN 250 MG/1
250 TABLET, FILM COATED ORAL SEE ADMIN INSTRUCTIONS
Qty: 6 TABLET | Refills: 0 | Status: SHIPPED | OUTPATIENT
Start: 2024-01-16 | End: 2024-01-21

## 2024-01-16 RX ORDER — SEMAGLUTIDE 0.25 MG/.5ML
0.25 INJECTION, SOLUTION SUBCUTANEOUS
Qty: 2 ML | Refills: 0 | Status: SHIPPED | OUTPATIENT
Start: 2024-01-16 | End: 2024-02-13

## 2024-01-16 RX ORDER — TIRZEPATIDE 2.5 MG/.5ML
2.5 INJECTION, SOLUTION SUBCUTANEOUS WEEKLY
Qty: 2 ML | Refills: 0 | Status: SHIPPED | OUTPATIENT
Start: 2024-01-16 | End: 2024-01-16

## 2024-01-16 ASSESSMENT — ENCOUNTER SYMPTOMS
VOMITING: 0
DIARRHEA: 1
RHINORRHEA: 1
ABDOMINAL PAIN: 0
COUGH: 1
SORE THROAT: 1
NAUSEA: 0

## 2024-01-16 NOTE — TELEPHONE ENCOUNTER
Ok  Wegovy is on back order so I tried Zepbound.  Please let patient know insurance will only cover Wegovy

## 2024-01-16 NOTE — PROGRESS NOTES
João Briones (:  1980) is a 43 y.o. male,Established patient, here for evaluation of the following chief complaint(s):  Sore Throat, Headache, and Otalgia      ASSESSMENT/PLAN:    ICD-10-CM    1. Acute pharyngitis, unspecified etiology  J02.9 azithromycin (ZITHROMAX) 250 MG tablet     Culture, Throat     POCT rapid strep A: negative      2. Sore throat  J02.9 azithromycin (ZITHROMAX) 250 MG tablet     Culture, Throat     POCT rapid strep A      3. Class 3 severe obesity due to excess calories with serious comorbidity and body mass index (BMI) of 45.0 to 49.9 in adult ([facility])  E66.01 Tirzepatide-Weight Management (ZEPBOUND) 2.5 MG/0.5ML SOAJ    Z68.42           Return if symptoms worsen or fail to improve.    SUBJECTIVE/OBJECTIVE:  HPI    Reports sore throat.  This started last Friday.  \"It feels like my throat is puffed and swollen.\"  \"It feels like I am swallowing razor blades.\"  Reports a lot of mucous.  Reports productive cough  Reports nasal drainage and PND  Denies abdominal pain.  Reports diarrhea this morning.  Denies nausea and vomiting.  Denies known fever.    /88   Pulse 91   Temp 97.6 °F (36.4 °C) (Temporal)   Ht 1.854 m (6' 1\")   Wt (!) 161 kg (355 lb)   SpO2 98%   BMI 46.84 kg/m²     Review of Systems   Constitutional:  Negative for fever.   HENT:  Positive for congestion, ear pain, rhinorrhea and sore throat.    Respiratory:  Positive for cough.    Gastrointestinal:  Positive for diarrhea. Negative for abdominal pain, nausea and vomiting.   Neurological:  Positive for headaches.       Physical Exam  Vitals reviewed.   Constitutional:       Appearance: He is well-developed. He is obese.   HENT:      Head: Normocephalic.      Right Ear: Tympanic membrane and external ear normal.      Left Ear: Tympanic membrane and external ear normal.      Nose: Nose normal.      Mouth/Throat:      Pharynx: Posterior oropharyngeal erythema present.   Cardiovascular:      Rate and Rhythm: Normal

## 2024-01-17 ENCOUNTER — TELEPHONE (OUTPATIENT)
Dept: FAMILY MEDICINE CLINIC | Age: 44
End: 2024-01-17

## 2024-01-17 NOTE — TELEPHONE ENCOUNTER
----- Message from LASHONDA Arrieta sent at 1/17/2024 11:49 AM CST -----  Preliminary throat culture shows normal respiratory maria ines.  Awaiting final

## 2024-01-18 DIAGNOSIS — T78.40XA RASH DUE TO ALLERGY: ICD-10-CM

## 2024-01-18 DIAGNOSIS — Z91.09 ENVIRONMENTAL ALLERGIES: ICD-10-CM

## 2024-01-18 DIAGNOSIS — R21 RASH DUE TO ALLERGY: ICD-10-CM

## 2024-01-18 DIAGNOSIS — F51.01 PRIMARY INSOMNIA: ICD-10-CM

## 2024-01-18 LAB — BACTERIA THROAT AEROBE CULT: NORMAL

## 2024-01-18 RX ORDER — MONTELUKAST SODIUM 10 MG/1
10 TABLET ORAL NIGHTLY
Qty: 90 TABLET | Refills: 2 | Status: SHIPPED | OUTPATIENT
Start: 2024-01-18

## 2024-01-18 RX ORDER — HYDROXYZINE 50 MG/1
TABLET, FILM COATED ORAL
Qty: 60 TABLET | Refills: 11 | Status: SHIPPED | OUTPATIENT
Start: 2024-01-18

## 2024-01-18 NOTE — TELEPHONE ENCOUNTER
----- Message from LASHONDA Arrieta sent at 1/18/2024 11:13 AM CST -----  Throat culture is negative

## 2024-01-18 NOTE — TELEPHONE ENCOUNTER
Received fax from pharmacy requesting refill on pts medication(s). Pt was last seen in office on 1/16/2024  and has a follow up scheduled for Visit date not found. Will send request to  Alejandra Atkinson  for .     Requested Prescriptions     Pending Prescriptions Disp Refills    hydrOXYzine HCl (ATARAX) 50 MG tablet [Pharmacy Med Name: HYDROXYZINE HCL 50 MG TABLET] 60 tablet 11     Sig: TAKE 1 TABLET BY MOUTH EVERY 6 HOURS AS NEEDED FOR ANXIETY **MAY MAKE DROWSY**

## 2024-01-18 NOTE — TELEPHONE ENCOUNTER
Received fax from pharmacy requesting refill on pts medication(s). Pt was last seen in office on 1/16/2024  and has a follow up scheduled for Visit date not found. Will send request to  [facility] At[facility]  for authorization.     Requested Prescriptions     Pending Prescriptions Disp Refills    montelukast (SINGULAIR) 10 MG tablet [Pharmacy Med Name: MONTELUKAST SOD 10 MG TABLET] 90 tablet 2     Sig: TAKE 1 TABLET BY MOUTH NIGHTLY

## 2024-02-14 ENCOUNTER — TELEPHONE (OUTPATIENT)
Dept: FAMILY MEDICINE CLINIC | Age: 44
End: 2024-02-14

## 2024-02-14 ENCOUNTER — OFFICE VISIT (OUTPATIENT)
Dept: FAMILY MEDICINE CLINIC | Age: 44
End: 2024-02-14
Payer: COMMERCIAL

## 2024-02-14 VITALS
BODY MASS INDEX: 41.75 KG/M2 | SYSTOLIC BLOOD PRESSURE: 130 MMHG | OXYGEN SATURATION: 98 % | WEIGHT: 315 LBS | HEIGHT: 73 IN | DIASTOLIC BLOOD PRESSURE: 70 MMHG | TEMPERATURE: 96.9 F | HEART RATE: 73 BPM

## 2024-02-14 DIAGNOSIS — E66.01 CLASS 3 SEVERE OBESITY DUE TO EXCESS CALORIES WITHOUT SERIOUS COMORBIDITY WITH BODY MASS INDEX (BMI) OF 45.0 TO 49.9 IN ADULT (HCC): ICD-10-CM

## 2024-02-14 DIAGNOSIS — K62.5 BRBPR (BRIGHT RED BLOOD PER RECTUM): Primary | ICD-10-CM

## 2024-02-14 DIAGNOSIS — K59.00 ACUTE CONSTIPATION: ICD-10-CM

## 2024-02-14 LAB
ALBUMIN SERPL-MCNC: 4.5 G/DL (ref 3.5–5.2)
ALP SERPL-CCNC: 119 U/L (ref 40–130)
ALT SERPL-CCNC: 24 U/L (ref 5–41)
ANION GAP SERPL CALCULATED.3IONS-SCNC: 16 MMOL/L (ref 7–19)
AST SERPL-CCNC: 22 U/L (ref 5–40)
BASOPHILS # BLD: 0 K/UL (ref 0–0.2)
BASOPHILS NFR BLD: 0.9 % (ref 0–1)
BILIRUB SERPL-MCNC: 0.5 MG/DL (ref 0.2–1.2)
BUN SERPL-MCNC: 11 MG/DL (ref 6–20)
CALCIUM SERPL-MCNC: 9 MG/DL (ref 8.6–10)
CHLORIDE SERPL-SCNC: 105 MMOL/L (ref 98–111)
CHOLEST SERPL-MCNC: 180 MG/DL (ref 160–199)
CO2 SERPL-SCNC: 22 MMOL/L (ref 22–29)
CREAT SERPL-MCNC: 0.8 MG/DL (ref 0.5–1.2)
EOSINOPHIL # BLD: 0.1 K/UL (ref 0–0.6)
EOSINOPHIL NFR BLD: 2.2 % (ref 0–5)
ERYTHROCYTE [DISTWIDTH] IN BLOOD BY AUTOMATED COUNT: 13.9 % (ref 11.5–14.5)
GLUCOSE SERPL-MCNC: 97 MG/DL (ref 74–109)
HCT VFR BLD AUTO: 43.1 % (ref 42–52)
HDLC SERPL-MCNC: 55 MG/DL (ref 55–121)
HGB BLD-MCNC: 13.7 G/DL (ref 14–18)
IMM GRANULOCYTES # BLD: 0 K/UL
LDLC SERPL CALC-MCNC: 103 MG/DL
LYMPHOCYTES # BLD: 1.4 K/UL (ref 1.1–4.5)
LYMPHOCYTES NFR BLD: 30.4 % (ref 20–40)
MCH RBC QN AUTO: 26.7 PG (ref 27–31)
MCHC RBC AUTO-ENTMCNC: 31.8 G/DL (ref 33–37)
MCV RBC AUTO: 84 FL (ref 80–94)
MONOCYTES # BLD: 0.3 K/UL (ref 0–0.9)
MONOCYTES NFR BLD: 6.7 % (ref 0–10)
NEUTROPHILS # BLD: 2.7 K/UL (ref 1.5–7.5)
NEUTS SEG NFR BLD: 59.4 % (ref 50–65)
PLATELET # BLD AUTO: 240 K/UL (ref 130–400)
PMV BLD AUTO: 10.5 FL (ref 9.4–12.4)
POTASSIUM SERPL-SCNC: 4.2 MMOL/L (ref 3.5–5)
PROT SERPL-MCNC: 7.2 G/DL (ref 6.6–8.7)
RBC # BLD AUTO: 5.13 M/UL (ref 4.7–6.1)
SODIUM SERPL-SCNC: 143 MMOL/L (ref 136–145)
TRIGL SERPL-MCNC: 108 MG/DL (ref 0–149)
TSH SERPL DL<=0.005 MIU/L-ACNC: 0.87 UIU/ML (ref 0.27–4.2)
WBC # BLD AUTO: 4.5 K/UL (ref 4.8–10.8)

## 2024-02-14 PROCEDURE — 99213 OFFICE O/P EST LOW 20 MIN: CPT | Performed by: NURSE PRACTITIONER

## 2024-02-14 RX ORDER — DOCUSATE SODIUM 100 MG/1
100 CAPSULE, LIQUID FILLED ORAL 2 TIMES DAILY
Qty: 60 CAPSULE | Refills: 3 | Status: SHIPPED | OUTPATIENT
Start: 2024-02-14 | End: 2024-06-13

## 2024-02-14 NOTE — TELEPHONE ENCOUNTER
----- Message from LASHONDA Arrieta sent at 2/14/2024  1:05 PM CST -----  Thyroid is WNL  Cholesterol is WNL  CMP: WNL

## 2024-02-14 NOTE — PROGRESS NOTES
João Briones (:  1980) is a 43 y.o. male,Established patient, here for evaluation of the following chief complaint(s):  Rectal Bleeding      ASSESSMENT/PLAN:    ICD-10-CM    1. BRBPR (bright red blood per rectum)  K62.5 Hydrocort-Pramoxine, Perianal, (PROCTOFOAM-HC) 1-1 % rectal foam    If bleeding does not improve discussed GI referral      2. Acute constipation  K59.00 docusate sodium (COLACE) 100 MG capsule  Increase water      3. Class 3 severe obesity due to excess calories without serious comorbidity with body mass index (BMI) of 45.0 to 49.9 in adult ([facility])  E66.01 Tirzepatide-Weight Management 2.5 MG/0.5ML SOAJ  Recommend diet and exercise    Z68.42 CBC with Auto Differential     Comprehensive Metabolic Panel     TSH     Lipid Panel          Return if symptoms worsen or fail to improve.    SUBJECTIVE/OBJECTIVE:  HPI    RECTAL BLEEDING  Patient thinks he has hemorrhoid.   He went to bathroom a couple of days ago and wiped and there was bright red blood. It happened again yesterday.   Most days he goes once a day, at least every other day  Stool is harder than usual  Denies nausea, vomiting or abdominal pain.  Drinks coffee, and 6-8 glasses of water.  Has regular diet. He has alpha gal diagnosed years ago.   Has discomfort during bowel movement, has had a tearing sensation.   Denies burning and itching   In third grade had an impaction.   He has not had a colonoscopy  Patient does heavy lifting at work  Has been using Tucks which seems to help    /70   Pulse 73   Temp 96.9 °F (36.1 °C) (Temporal)   Ht 1.854 m (6' 1\")   Wt (!) 162.8 kg (359 lb)   SpO2 98%   BMI 47.36 kg/m²     Review of Systems   Constitutional: Negative.    HENT:  Positive for congestion and rhinorrhea.    Respiratory: Negative.     Cardiovascular: Negative.    Gastrointestinal:  Positive for anal bleeding.   Genitourinary: Negative.    Musculoskeletal: Negative.    Skin: Negative.    Allergic/Immunologic: Positive

## 2024-02-16 ENCOUNTER — TELEPHONE (OUTPATIENT)
Dept: FAMILY MEDICINE CLINIC | Age: 44
End: 2024-02-16

## 2024-02-16 NOTE — TELEPHONE ENCOUNTER
Please let patient know that Zaxenda is not covered.  He has tried Saxenda in the past  Wegovy has been on backorder

## 2024-06-06 ENCOUNTER — PATIENT MESSAGE (OUTPATIENT)
Dept: FAMILY MEDICINE CLINIC | Age: 44
End: 2024-06-06

## 2024-06-06 DIAGNOSIS — E66.01 CLASS 3 SEVERE OBESITY DUE TO EXCESS CALORIES WITHOUT SERIOUS COMORBIDITY WITH BODY MASS INDEX (BMI) OF 45.0 TO 49.9 IN ADULT (HCC): ICD-10-CM

## 2024-06-06 NOTE — TELEPHONE ENCOUNTER
From: João Briones  To: Lyndsay Atkinson  Sent: 2024 11:52 AM CDT  Subject: Wegovy    Hey Doc! So, apparently my pharmacy messed up my wegovy prescription. Said it was  and caused it to kick out. It been a mess and frustrating    They can get it but got to order it. My insurance is being a pain. I've apparently gotta enroll in a weight management bs [facility], which I've done twice now. Any thoughts or ideas to help? Cam you [facility] the [facility] to [facility]?

## 2024-06-10 NOTE — TELEPHONE ENCOUNTER
Patient called stating the 1200 is at [facility] - he would like Cielo FAYE to call him back tomorrow

## 2024-06-11 NOTE — TELEPHONE ENCOUNTER
Spoke to patient  He was able to get the Wegovy at [facility]  He has an appointment with the [facility] weight management nutritionist 6/12/24  Med list updated

## 2024-06-18 ENCOUNTER — OFFICE VISIT (OUTPATIENT)
Dept: NEUROLOGY | Age: 44
End: 2024-06-18
Payer: COMMERCIAL

## 2024-06-18 VITALS
HEART RATE: 82 BPM | WEIGHT: 315 LBS | HEIGHT: 73 IN | DIASTOLIC BLOOD PRESSURE: 80 MMHG | RESPIRATION RATE: 20 BRPM | SYSTOLIC BLOOD PRESSURE: 115 MMHG | BODY MASS INDEX: 41.75 KG/M2

## 2024-06-18 DIAGNOSIS — E66.01 MORBID OBESITY (HCC): ICD-10-CM

## 2024-06-18 DIAGNOSIS — F51.01 PRIMARY INSOMNIA: ICD-10-CM

## 2024-06-18 DIAGNOSIS — G47.33 SLEEP APNEA, OBSTRUCTIVE: Primary | ICD-10-CM

## 2024-06-18 PROCEDURE — 99213 OFFICE O/P EST LOW 20 MIN: CPT | Performed by: PSYCHIATRY & NEUROLOGY

## 2024-06-18 RX ORDER — SEMAGLUTIDE 0.25 MG/.5ML
INJECTION, SOLUTION SUBCUTANEOUS
COMMUNITY
Start: 2024-06-10

## 2024-06-18 NOTE — PROGRESS NOTES
[facility]  [address], [address]  [place]  [address]  Phone [phone]  Fax [phone]     [facility] Follow Up Encounter  24 11:31 AM CDT    Information:   Patient Name: João Briones  :   1980  Age:   43 y.o.  MRN:   [id number]  Account #:  [id number]  Today:  24    Provider: Adal Cage M.D.     Chief Complaint:   Chief Complaint   Patient presents with    Follow-up    Sleep Apnea     Has headache in the morning when using machine, having  a hard time with getting supplies and  cost       Subjective:   João Briones is a 43 y.o. man with obstructive sleep apnea who is following up.  He was diagnosed with QUENTIN in  when he had a HST due to snoring and daytime fatigue.  He had difficulty tolerating APAP and had a titration polysomnogram in .  He only needed CPAP at a fairly low pressure.  He has struggled with CPAP use for many reasons, ability to afford masks and other replicables.  He is hardly using his CPAP recently.  He complains of not being able to use a full face mask as it causes a dry mouth.  He uses a nasal prong mask but complains of nasal obstruction, rhinorrhea, sinus congestion.  His weight is about the same.        Objective:     Past Medical History:  Past Medical History:   Diagnosis Date    Hypertension     Obesity     Sleep apnea        Past Surgical History:   Procedure Laterality Date    GASTRIC BYPASS SURGERY         Recent Hospitalizations  None    Significant Injuries  None    Habits  João Briones reports that he has never smoked. He has never used smokeless tobacco. He reports current alcohol use of about 4.0 - 5.0 standard drinks of alcohol per week. He reports that he does not use drugs.    No family history on file.    Social History  João Briones is single, lives in [place], [place], and works for the [employer].     Medications:  Current Outpatient Medications   Medication Sig Dispense Refill

## 2024-07-01 ENCOUNTER — PATIENT MESSAGE (OUTPATIENT)
Dept: FAMILY MEDICINE CLINIC | Age: 44
End: 2024-07-01

## 2024-07-01 DIAGNOSIS — E66.01 CLASS 3 SEVERE OBESITY DUE TO EXCESS CALORIES WITHOUT SERIOUS COMORBIDITY WITH BODY MASS INDEX (BMI) OF 45.0 TO 49.9 IN ADULT (HCC): Primary | ICD-10-CM

## 2024-07-01 RX ORDER — SEMAGLUTIDE 0.5 MG/.5ML
0.5 INJECTION, SOLUTION SUBCUTANEOUS
Qty: 2 ML | Refills: 0 | Status: SHIPPED | OUTPATIENT
Start: 2024-07-01

## 2024-07-02 DIAGNOSIS — F51.01 PRIMARY INSOMNIA: ICD-10-CM

## 2024-07-02 RX ORDER — TRAZODONE HYDROCHLORIDE 50 MG/1
TABLET ORAL
Qty: 180 TABLET | Refills: 3 | Status: SHIPPED | OUTPATIENT
Start: 2024-07-02

## 2024-07-02 NOTE — TELEPHONE ENCOUNTER
Requested Prescriptions     Pending Prescriptions Disp Refills    traZODone (DESYREL) 50 MG tablet [Pharmacy Med Name: TRAZODONE 50 MG TABLET] 180 tablet 2     Sig: TAKE 1 TO 2 TABLETS BY MOUTH EVERY NIGHT AT BEDTIME, MAY CAUSE DROWSINESS       Last Office Visit: 6/18/2024  Next Office Visit: Visit date not found  Last Medication Refill: 10/23/2023 with 5 RF

## 2024-07-05 ENCOUNTER — TELEPHONE (OUTPATIENT)
Dept: FAMILY MEDICINE CLINIC | Age: 44
End: 2024-07-05

## 2024-07-05 NOTE — TELEPHONE ENCOUNTER
Patient called wanting a copy of his most recent lab results from 2/14/24. Patient came to  print out of results at .

## 2024-07-29 ENCOUNTER — PATIENT MESSAGE (OUTPATIENT)
Dept: FAMILY MEDICINE CLINIC | Age: 44
End: 2024-07-29

## 2024-07-29 DIAGNOSIS — E66.01 CLASS 3 SEVERE OBESITY DUE TO EXCESS CALORIES WITHOUT SERIOUS COMORBIDITY WITH BODY MASS INDEX (BMI) OF 45.0 TO 49.9 IN ADULT (HCC): Primary | ICD-10-CM

## 2024-07-29 NOTE — TELEPHONE ENCOUNTER
From: João Briones  To: Lyndsay Atkinson  Sent: 7/29/2024 12:43 PM CDT  Subject: Ibrahima Umana,  I have finished my second round of ibrahima. Things are going great, I'm about 20 pounds down according to my scale at home and the new one the program sent me. My dietician through the program is awesome and very helpful.   I will need my next dose sent into [facility] pharmacy. Do i need to schedule an appointment or we good?

## 2024-07-29 NOTE — TELEPHONE ENCOUNTER
Received call/My Chart Message from patient requesting refill on medication(s). Pt was last seen in office on 2/14/2024  and has a follow up scheduled for Visit date not found. Will send request to provider for authorization.     Requested Prescriptions     Pending Prescriptions Disp Refills    Semaglutide-Weight Management (WEGOVY) 1 MG/0.5ML SOAJ SC injection 2 mL 3     Sig: Inject 1 mg into the skin every 7 days

## 2024-08-06 DIAGNOSIS — E66.01 CLASS 3 SEVERE OBESITY DUE TO EXCESS CALORIES WITHOUT SERIOUS COMORBIDITY WITH BODY MASS INDEX (BMI) OF 45.0 TO 49.9 IN ADULT (HCC): Primary | ICD-10-CM

## 2024-08-06 RX ORDER — SEMAGLUTIDE 0.5 MG/.5ML
0.5 INJECTION, SOLUTION SUBCUTANEOUS
Qty: 2 ML | Refills: 1 | Status: SHIPPED | OUTPATIENT
Start: 2024-08-06

## 2024-08-28 DIAGNOSIS — Z91.09 ENVIRONMENTAL ALLERGIES: ICD-10-CM

## 2024-08-28 DIAGNOSIS — E66.01 CLASS 3 SEVERE OBESITY DUE TO EXCESS CALORIES WITHOUT SERIOUS COMORBIDITY WITH BODY MASS INDEX (BMI) OF 45.0 TO 49.9 IN ADULT (HCC): ICD-10-CM

## 2024-08-28 RX ORDER — CETIRIZINE HYDROCHLORIDE 10 MG/1
10 TABLET ORAL DAILY
Qty: 90 TABLET | Refills: 3 | Status: SHIPPED | OUTPATIENT
Start: 2024-08-28

## 2024-08-28 RX ORDER — SEMAGLUTIDE 0.5 MG/.5ML
0.5 INJECTION, SOLUTION SUBCUTANEOUS
Qty: 2 ML | Refills: 1 | Status: SHIPPED | OUTPATIENT
Start: 2024-08-28

## 2024-08-28 NOTE — TELEPHONE ENCOUNTER
Received fax from pharmacy requesting refill on pts medication(s). Pt was last seen in office on 2/14/2024  and has a follow up scheduled for Visit date not found. Will send request to  [facility] Atkinson  for authorization.     Requested Prescriptions     Pending Prescriptions Disp Refills    cetirizine (ZYRTEC) 10 MG tablet 90 tablet 3     Sig: Take 1 tablet by mouth daily    Semaglutide-Weight Management (WEGOVY) 0.5 MG/0.5ML SOAJ SC injection 2 mL 1     Sig: Inject 0.5 mg into the skin every 7 days

## 2024-09-23 DIAGNOSIS — E66.01 CLASS 3 SEVERE OBESITY DUE TO EXCESS CALORIES WITHOUT SERIOUS COMORBIDITY WITH BODY MASS INDEX (BMI) OF 45.0 TO 49.9 IN ADULT: ICD-10-CM

## 2024-09-23 DIAGNOSIS — Z91.09 ENVIRONMENTAL ALLERGIES: ICD-10-CM

## 2024-09-23 RX ORDER — SEMAGLUTIDE 0.5 MG/.5ML
0.5 INJECTION, SOLUTION SUBCUTANEOUS
Qty: 2 ML | Refills: 0 | Status: SHIPPED | OUTPATIENT
Start: 2024-09-23

## 2024-09-23 RX ORDER — MONTELUKAST SODIUM 10 MG/1
10 TABLET ORAL NIGHTLY
Qty: 30 TABLET | Refills: 0 | Status: SHIPPED | OUTPATIENT
Start: 2024-09-23

## 2024-10-21 DIAGNOSIS — E66.01 CLASS 3 SEVERE OBESITY DUE TO EXCESS CALORIES WITHOUT SERIOUS COMORBIDITY WITH BODY MASS INDEX (BMI) OF 45.0 TO 49.9 IN ADULT: ICD-10-CM

## 2024-10-21 DIAGNOSIS — E66.813 CLASS 3 SEVERE OBESITY DUE TO EXCESS CALORIES WITHOUT SERIOUS COMORBIDITY WITH BODY MASS INDEX (BMI) OF 45.0 TO 49.9 IN ADULT: ICD-10-CM

## 2024-10-21 NOTE — TELEPHONE ENCOUNTER
Received fax from pharmacy requesting refill on pts medication(s). Pt was last seen in office on 2/14/2024  and has a follow up scheduled for Visit date not found. Will send request to  Ramiro Sarmiento  for authorization.     Requested Prescriptions     Pending Prescriptions Disp Refills    Semaglutide-Weight Management (WEGOVY) 0.5 MG/0.5ML SOAJ SC injection 2 mL 0     Sig: Inject 0.5 mg into the skin every 7 days

## 2024-10-22 RX ORDER — SEMAGLUTIDE 0.5 MG/.5ML
0.5 INJECTION, SOLUTION SUBCUTANEOUS
Qty: 2 ML | Refills: 0 | OUTPATIENT
Start: 2024-10-22

## 2024-10-22 NOTE — TELEPHONE ENCOUNTER
Patient states that he is ready to go up but states that he is not sure that if it is available if not he will stick with the 0.5      Received fax from pharmacy requesting refill on pts medication(s). Pt was last seen in office on 2/14/2024  and has a follow up scheduled for Visit date not found. Will send request to  Alejandra Moe  for authorization.     Requested Prescriptions     Pending Prescriptions Disp Refills    Semaglutide-Weight Management (WEGOVY) 1 MG/0.5ML SOAJ SC injection 2 mL 0     Sig: Inject 1 mg into the skin every 7 days     Refused Prescriptions Disp Refills    Semaglutide-Weight Management (WEGOVY) 0.5 MG/0.5ML SOAJ SC injection 2 mL 0     Sig: Inject 0.5 mg into the skin every 7 days     Refused By: MAGNUS MOE     Reason for Refusal: Other

## 2024-10-22 NOTE — TELEPHONE ENCOUNTER
Pt called stating he saw on mychat where RX was denied - I made him aware that the 0.5 dose was denied but the 1mg did get sent in to  pharmacy     He will let us know if there is any issue with availability

## 2024-10-22 NOTE — TELEPHONE ENCOUNTER
Please call patient. This refill came from the pharmacy. Does the patient want to stay on current dose (0.5 mg) or go up to the 1 mg?

## 2024-11-01 DIAGNOSIS — E66.01 CLASS 3 SEVERE OBESITY DUE TO EXCESS CALORIES WITHOUT SERIOUS COMORBIDITY WITH BODY MASS INDEX (BMI) OF 45.0 TO 49.9 IN ADULT: Primary | ICD-10-CM

## 2024-11-01 DIAGNOSIS — E66.813 CLASS 3 SEVERE OBESITY DUE TO EXCESS CALORIES WITHOUT SERIOUS COMORBIDITY WITH BODY MASS INDEX (BMI) OF 45.0 TO 49.9 IN ADULT: Primary | ICD-10-CM

## 2024-11-01 RX ORDER — SEMAGLUTIDE 1.7 MG/.75ML
1.7 INJECTION, SOLUTION SUBCUTANEOUS
Qty: 3 ML | Refills: 3 | Status: SHIPPED | OUTPATIENT
Start: 2024-11-01

## 2024-11-26 DIAGNOSIS — Z91.09 ENVIRONMENTAL ALLERGIES: ICD-10-CM

## 2024-11-26 DIAGNOSIS — E66.813 CLASS 3 SEVERE OBESITY DUE TO EXCESS CALORIES WITHOUT SERIOUS COMORBIDITY WITH BODY MASS INDEX (BMI) OF 45.0 TO 49.9 IN ADULT: ICD-10-CM

## 2024-11-26 DIAGNOSIS — E66.01 CLASS 3 SEVERE OBESITY DUE TO EXCESS CALORIES WITHOUT SERIOUS COMORBIDITY WITH BODY MASS INDEX (BMI) OF 45.0 TO 49.9 IN ADULT: ICD-10-CM

## 2024-11-26 RX ORDER — SEMAGLUTIDE 1.7 MG/.75ML
1.7 INJECTION, SOLUTION SUBCUTANEOUS
Qty: 3 ML | Refills: 0 | Status: SHIPPED | OUTPATIENT
Start: 2024-11-26

## 2024-11-26 RX ORDER — MONTELUKAST SODIUM 10 MG/1
10 TABLET ORAL NIGHTLY
Qty: 30 TABLET | Refills: 0 | Status: SHIPPED | OUTPATIENT
Start: 2024-11-26

## 2024-11-26 RX ORDER — CETIRIZINE HYDROCHLORIDE 10 MG/1
10 TABLET ORAL DAILY
Qty: 30 TABLET | Refills: 0 | Status: SHIPPED | OUTPATIENT
Start: 2024-11-26

## 2024-11-26 NOTE — TELEPHONE ENCOUNTER
Received fax from pharmacy requesting refill on pts medication(s). Pt was last seen in office on 2/14/2024  and has a follow up scheduled for 11/26/2024. Will send request to  Alejandra Atkinson  for authorization.     Requested Prescriptions     Pending Prescriptions Disp Refills    montelukast (SINGULAIR) 10 MG tablet 30 tablet 11     Sig: Take 1 tablet by mouth nightly

## 2024-11-26 NOTE — TELEPHONE ENCOUNTER
Received fax from pharmacy requesting refill on pts medication(s). Pt was last seen in office on 2/14/2024  and has a follow up scheduled for Visit date not found. Will send request to  Alejandra Atkinson  for authorization.     Requested Prescriptions     Pending Prescriptions Disp Refills    cetirizine (ZYRTEC) 10 MG tablet 90 tablet 3     Sig: Take 1 tablet by mouth daily    Semaglutide-Weight Management (WEGOVY) 1.7 MG/0.75ML SOAJ SC injection 3 mL 3     Sig: Inject 1.7 mg into the skin every 7 days

## 2024-12-28 DIAGNOSIS — E66.01 CLASS 3 SEVERE OBESITY DUE TO EXCESS CALORIES WITHOUT SERIOUS COMORBIDITY WITH BODY MASS INDEX (BMI) OF 45.0 TO 49.9 IN ADULT: ICD-10-CM

## 2024-12-28 DIAGNOSIS — Z91.09 ENVIRONMENTAL ALLERGIES: ICD-10-CM

## 2024-12-28 DIAGNOSIS — K21.9 GASTROESOPHAGEAL REFLUX DISEASE, UNSPECIFIED WHETHER ESOPHAGITIS PRESENT: ICD-10-CM

## 2024-12-28 DIAGNOSIS — E66.813 CLASS 3 SEVERE OBESITY DUE TO EXCESS CALORIES WITHOUT SERIOUS COMORBIDITY WITH BODY MASS INDEX (BMI) OF 45.0 TO 49.9 IN ADULT: ICD-10-CM

## 2024-12-30 RX ORDER — PANTOPRAZOLE SODIUM 40 MG/1
40 TABLET, DELAYED RELEASE ORAL 2 TIMES DAILY
Qty: 180 TABLET | Refills: 3 | Status: SHIPPED | OUTPATIENT
Start: 2024-12-30

## 2024-12-30 RX ORDER — SEMAGLUTIDE 1.7 MG/.75ML
1.7 INJECTION, SOLUTION SUBCUTANEOUS
Qty: 3 ML | Refills: 0 | Status: SHIPPED | OUTPATIENT
Start: 2024-12-30

## 2024-12-30 RX ORDER — CETIRIZINE HYDROCHLORIDE 10 MG/1
10 TABLET ORAL DAILY
Qty: 30 TABLET | Refills: 0 | Status: SHIPPED | OUTPATIENT
Start: 2024-12-30

## 2024-12-30 RX ORDER — MONTELUKAST SODIUM 10 MG/1
10 TABLET ORAL NIGHTLY
Qty: 30 TABLET | Refills: 0 | Status: SHIPPED | OUTPATIENT
Start: 2024-12-30

## 2024-12-30 NOTE — TELEPHONE ENCOUNTER
João Briones called to request a refill on his medication.      Last office visit : 2/14/2024   Next office visit :     Requested Prescriptions     Pending Prescriptions Disp Refills    montelukast (SINGULAIR) 10 MG tablet 30 tablet 0     Sig: Take 1 tablet by mouth nightly    cetirizine (ZYRTEC) 10 MG tablet 30 tablet 0     Sig: Take 1 tablet by mouth daily    Semaglutide-Weight Management (WEGOVY) 1.7 MG/0.75ML SOAJ SC injection 3 mL 0     Sig: Inject 1.7 mg into the skin every 7 days            Veronica Acosta MA

## 2024-12-30 NOTE — TELEPHONE ENCOUNTER
Received [employer] message from pt requesting refill on pts medication(s). Pt was last seen in office on 2/14/2024 and has a follow up scheduled for Visit date not found. Will send request to LASHONDA Arrieta for authorization.     Requested Prescriptions     Pending Prescriptions Disp Refills    pantoprazole (PROTONIX) 40 MG tablet 180 tablet 3     Sig: Take 1 tablet by mouth in the morning and at bedtime

## 2025-01-14 ENCOUNTER — OFFICE VISIT (OUTPATIENT)
Age: 45
End: 2025-01-14
Payer: COMMERCIAL

## 2025-01-14 VITALS
SYSTOLIC BLOOD PRESSURE: 116 MMHG | OXYGEN SATURATION: 99 % | WEIGHT: 309 LBS | HEIGHT: 73 IN | TEMPERATURE: 97 F | DIASTOLIC BLOOD PRESSURE: 80 MMHG | HEART RATE: 90 BPM | BODY MASS INDEX: 40.95 KG/M2

## 2025-01-14 DIAGNOSIS — Z00.00 ENCOUNTER FOR WELL ADULT EXAM WITHOUT ABNORMAL FINDINGS: Primary | ICD-10-CM

## 2025-01-14 DIAGNOSIS — M54.6 CHRONIC BILATERAL THORACIC BACK PAIN: ICD-10-CM

## 2025-01-14 DIAGNOSIS — Z91.09 ENVIRONMENTAL ALLERGIES: ICD-10-CM

## 2025-01-14 DIAGNOSIS — E66.01 CLASS 3 SEVERE OBESITY DUE TO EXCESS CALORIES WITHOUT SERIOUS COMORBIDITY WITH BODY MASS INDEX (BMI) OF 40.0 TO 44.9 IN ADULT: ICD-10-CM

## 2025-01-14 DIAGNOSIS — F51.01 PRIMARY INSOMNIA: ICD-10-CM

## 2025-01-14 DIAGNOSIS — G89.29 CHRONIC BILATERAL THORACIC BACK PAIN: ICD-10-CM

## 2025-01-14 DIAGNOSIS — K21.9 GASTROESOPHAGEAL REFLUX DISEASE, UNSPECIFIED WHETHER ESOPHAGITIS PRESENT: ICD-10-CM

## 2025-01-14 DIAGNOSIS — E66.813 CLASS 3 SEVERE OBESITY DUE TO EXCESS CALORIES WITHOUT SERIOUS COMORBIDITY WITH BODY MASS INDEX (BMI) OF 40.0 TO 44.9 IN ADULT: ICD-10-CM

## 2025-01-14 PROCEDURE — 99396 PREV VISIT EST AGE 40-64: CPT | Performed by: NURSE PRACTITIONER

## 2025-01-14 RX ORDER — MONTELUKAST SODIUM 10 MG/1
10 TABLET ORAL NIGHTLY
Qty: 90 TABLET | Refills: 3 | Status: SHIPPED | OUTPATIENT
Start: 2025-01-14

## 2025-01-14 RX ORDER — CETIRIZINE HYDROCHLORIDE 10 MG/1
10 TABLET ORAL DAILY
Qty: 90 TABLET | Refills: 3 | Status: SHIPPED | OUTPATIENT
Start: 2025-01-14

## 2025-01-14 RX ORDER — FLUTICASONE PROPIONATE 50 MCG
2 SPRAY, SUSPENSION (ML) NASAL DAILY
Qty: 1 EACH | Refills: 5 | Status: SHIPPED | OUTPATIENT
Start: 2025-01-14

## 2025-01-14 RX ORDER — HYDROXYZINE HYDROCHLORIDE 50 MG/1
TABLET, FILM COATED ORAL
Qty: 60 TABLET | Refills: 11 | Status: SHIPPED | OUTPATIENT
Start: 2025-01-14

## 2025-01-14 RX ORDER — METHOCARBAMOL 500 MG/1
500 TABLET, FILM COATED ORAL NIGHTLY PRN
Qty: 30 TABLET | Refills: 3 | Status: SHIPPED | OUTPATIENT
Start: 2025-01-14

## 2025-01-14 SDOH — ECONOMIC STABILITY: FOOD INSECURITY: WITHIN THE PAST 12 MONTHS, YOU WORRIED THAT YOUR FOOD WOULD RUN OUT BEFORE YOU GOT MONEY TO BUY MORE.: NEVER TRUE

## 2025-01-14 SDOH — ECONOMIC STABILITY: FOOD INSECURITY: WITHIN THE PAST 12 MONTHS, THE FOOD YOU BOUGHT JUST DIDN'T LAST AND YOU DIDN'T HAVE MONEY TO GET MORE.: NEVER TRUE

## 2025-01-14 ASSESSMENT — ENCOUNTER SYMPTOMS
CONSTIPATION: 0
BACK PAIN: 1
ABDOMINAL PAIN: 0
SHORTNESS OF BREATH: 0
SORE THROAT: 0
RHINORRHEA: 0
COUGH: 0
EYE REDNESS: 0
VOMITING: 0
DIARRHEA: 0

## 2025-01-14 ASSESSMENT — PATIENT HEALTH QUESTIONNAIRE - PHQ9
SUM OF ALL RESPONSES TO PHQ QUESTIONS 1-9: 0
SUM OF ALL RESPONSES TO PHQ9 QUESTIONS 1 & 2: 0
SUM OF ALL RESPONSES TO PHQ QUESTIONS 1-9: 0
2. FEELING DOWN, DEPRESSED OR HOPELESS: NOT AT ALL
SUM OF ALL RESPONSES TO PHQ QUESTIONS 1-9: 0
1. LITTLE INTEREST OR PLEASURE IN DOING THINGS: NOT AT ALL
SUM OF ALL RESPONSES TO PHQ QUESTIONS 1-9: 0

## 2025-01-14 NOTE — PROGRESS NOTES
2025    João Briones (:  1980) is a 44 y.o. male, here for a preventive medicine evaluation.    He is on a weight loss program through [facility]  358 pounds at home when he started the weight loss program.  298.2 pounds this morning.  He started the Wegovy six months ago.  \"I have to force myself to eat.\"  \"There is no processed or fast food.\"  \"Lunch is very small.\"  \"Dinner is usually chicken.\"  He continues on Wegovy 1.7 mg weekly.  Pants have decreased from 48 to 44  \"I go the gym.\"    BACK PAIN:  Reports increased mid-back pain.  \"It stays so tight back there.\"  He has been seeing a chiropractor.    Eyes: Annually  Dentist:  \"Next week\"  Labs:  Due next month  Nocturia:  No  Stream: Steady  Colonoscopy:  No colonoscopy on file   No cologuard on file  No FIT/FOBT on file   No flexible sigmoidoscopy on file  Denies blood in stool recently  Exercise: \"I go to the gym.\"  Diet and Nut:   Small portions  MVI:  Yes  Supplements:  No  Asa: No  Depression:  No concerns  Body Image: Overweight  Tobacco: None   Substance: ETOH socially  Immunization:  Defers flu shot  Sleep: Ok  He is not using the CPAP.  \"I have tried 4-5 different masks.\"  Cognition: No concerns    Subjective   Patient Active Problem List   Diagnosis    Obstructive sleep apnea syndrome    Class 3 severe obesity due to excess calories without serious comorbidity with body mass index (BMI) of 45.0 to 49.9 in adult    Environmental allergies     Review of Systems   Constitutional:  Negative for chills, fatigue and fever.   HENT:  Negative for congestion, ear pain, rhinorrhea and sore throat.    Eyes:  Negative for redness.   Respiratory:  Negative for cough and shortness of breath.    Cardiovascular:  Negative for chest pain.   Gastrointestinal:  Negative for abdominal pain, constipation, diarrhea and vomiting.        BELEM is well controlled   Genitourinary:  Negative for dysuria and urgency.   Musculoskeletal:  Positive for back pain.

## 2025-01-14 NOTE — ASSESSMENT & PLAN NOTE
Orders:    montelukast (SINGULAIR) 10 MG tablet; Take 1 tablet by mouth nightly    hydrOXYzine HCl (ATARAX) 50 MG tablet; TAKE 1 TABLET BY MOUTH EVERY 6 HOURS AS NEEDED FOR ANXIETY **MAY MAKE DROWSY**    cetirizine (ZYRTEC) 10 MG tablet; Take 1 tablet by mouth daily    fluticasone (FLONASE) 50 MCG/ACT nasal spray; 2 sprays by Nasal route daily

## 2025-01-15 ENCOUNTER — TELEPHONE (OUTPATIENT)
Age: 45
End: 2025-01-15

## 2025-01-15 DIAGNOSIS — E66.01 CLASS 3 SEVERE OBESITY DUE TO EXCESS CALORIES WITHOUT SERIOUS COMORBIDITY WITH BODY MASS INDEX (BMI) OF 40.0 TO 44.9 IN ADULT: ICD-10-CM

## 2025-01-15 DIAGNOSIS — E66.813 CLASS 3 SEVERE OBESITY DUE TO EXCESS CALORIES WITHOUT SERIOUS COMORBIDITY WITH BODY MASS INDEX (BMI) OF 40.0 TO 44.9 IN ADULT: ICD-10-CM

## 2025-01-15 LAB
ALBUMIN SERPL-MCNC: 4.3 G/DL (ref 3.5–5.2)
ALP SERPL-CCNC: 117 U/L (ref 40–129)
ALT SERPL-CCNC: 17 U/L (ref 5–41)
ANION GAP SERPL CALCULATED.3IONS-SCNC: 9 MMOL/L (ref 7–19)
AST SERPL-CCNC: 19 U/L (ref 5–40)
BASOPHILS # BLD: 0 K/UL (ref 0–0.2)
BASOPHILS NFR BLD: 0.9 % (ref 0–1)
BILIRUB SERPL-MCNC: 0.7 MG/DL (ref 0.2–1.2)
BUN SERPL-MCNC: 8 MG/DL (ref 6–20)
CALCIUM SERPL-MCNC: 9.2 MG/DL (ref 8.6–10)
CHLORIDE SERPL-SCNC: 103 MMOL/L (ref 98–111)
CHOLEST SERPL-MCNC: 171 MG/DL (ref 0–199)
CO2 SERPL-SCNC: 27 MMOL/L (ref 22–29)
CREAT SERPL-MCNC: 0.8 MG/DL (ref 0.7–1.2)
EOSINOPHIL # BLD: 0.1 K/UL (ref 0–0.6)
EOSINOPHIL NFR BLD: 2.2 % (ref 0–5)
ERYTHROCYTE [DISTWIDTH] IN BLOOD BY AUTOMATED COUNT: 13.7 % (ref 11.5–14.5)
GLUCOSE SERPL-MCNC: 80 MG/DL (ref 70–99)
HCT VFR BLD AUTO: 43.5 % (ref 42–52)
HDLC SERPL-MCNC: 51 MG/DL (ref 40–60)
HGB BLD-MCNC: 13.9 G/DL (ref 14–18)
IMM GRANULOCYTES # BLD: 0 K/UL
LDLC SERPL CALC-MCNC: 101 MG/DL
LYMPHOCYTES # BLD: 1.3 K/UL (ref 1.1–4.5)
LYMPHOCYTES NFR BLD: 28.4 % (ref 20–40)
MCH RBC QN AUTO: 27 PG (ref 27–31)
MCHC RBC AUTO-ENTMCNC: 32 G/DL (ref 33–37)
MCV RBC AUTO: 84.5 FL (ref 80–94)
MONOCYTES # BLD: 0.4 K/UL (ref 0–0.9)
MONOCYTES NFR BLD: 7.9 % (ref 0–10)
NEUTROPHILS # BLD: 2.8 K/UL (ref 1.5–7.5)
NEUTS SEG NFR BLD: 60.4 % (ref 50–65)
PLATELET # BLD AUTO: 234 K/UL (ref 130–400)
PMV BLD AUTO: 10.6 FL (ref 9.4–12.4)
POTASSIUM SERPL-SCNC: 3.7 MMOL/L (ref 3.5–5)
PROT SERPL-MCNC: 6.9 G/DL (ref 6.4–8.3)
RBC # BLD AUTO: 5.15 M/UL (ref 4.7–6.1)
SODIUM SERPL-SCNC: 139 MMOL/L (ref 136–145)
TRIGL SERPL-MCNC: 94 MG/DL (ref 0–149)
TSH SERPL DL<=0.005 MIU/L-ACNC: 1.32 UIU/ML (ref 0.27–4.2)
WBC # BLD AUTO: 4.6 K/UL (ref 4.8–10.8)

## 2025-01-15 NOTE — TELEPHONE ENCOUNTER
----- Message from LASHONDA Arrieta sent at 1/15/2025 12:55 PM CST -----  Cholesterol is borderline.  Recommend continuing diet  CBC is essentially within normal limits  CMP is within normal limits.  This includes normal electrolytes, kidney function and liver function

## 2025-01-23 DIAGNOSIS — E66.01 CLASS 3 SEVERE OBESITY DUE TO EXCESS CALORIES WITHOUT SERIOUS COMORBIDITY WITH BODY MASS INDEX (BMI) OF 45.0 TO 49.9 IN ADULT: ICD-10-CM

## 2025-01-23 DIAGNOSIS — E66.813 CLASS 3 SEVERE OBESITY DUE TO EXCESS CALORIES WITHOUT SERIOUS COMORBIDITY WITH BODY MASS INDEX (BMI) OF 45.0 TO 49.9 IN ADULT: ICD-10-CM

## 2025-01-23 DIAGNOSIS — Z91.09 ENVIRONMENTAL ALLERGIES: ICD-10-CM

## 2025-01-23 RX ORDER — CETIRIZINE HYDROCHLORIDE 10 MG/1
10 TABLET ORAL DAILY
Qty: 90 TABLET | Refills: 3 | OUTPATIENT
Start: 2025-01-23

## 2025-01-23 RX ORDER — SEMAGLUTIDE 1.7 MG/.75ML
1.7 INJECTION, SOLUTION SUBCUTANEOUS
Qty: 3 ML | Refills: 5 | Status: SHIPPED | OUTPATIENT
Start: 2025-01-23

## 2025-01-23 NOTE — TELEPHONE ENCOUNTER
Received fax from pharmacy requesting refill on pts medication(s). Pt was last seen in office on 1/14/2025  and has a follow up scheduled for 7/15/25. Will send request to  Alejandra Atkinson  for patient.     Requested Prescriptions     Pending Prescriptions Disp Refills    Semaglutide-Weight Management (WEGOVY) 1.7 MG/0.75ML SOAJ SC injection 3 mL 0     Sig: Inject 1.7 mg into the skin every 7 days

## 2025-01-23 NOTE — TELEPHONE ENCOUNTER
Received fax from pharmacy requesting refill on pts medication(s). Pt was last seen in office on 1/14/2025  and has a follow up scheduled for 7/15/2025. Will send request to  Alejandra Atkinson  for patient.     Requested Prescriptions     Refused Prescriptions Disp Refills    cetirizine (ZYRTEC) 10 MG tablet 90 tablet 3     Sig: Take 1 tablet by mouth daily     Called pt to make him aware that this RX was sent to  1/14/25 - he states that he thought so but they sent it back due to an issue with RX     I will call  pharmacy to clarify what is needed

## 2025-02-11 ENCOUNTER — OFFICE VISIT (OUTPATIENT)
Age: 45
End: 2025-02-11
Payer: COMMERCIAL

## 2025-02-11 VITALS
OXYGEN SATURATION: 97 % | HEART RATE: 87 BPM | DIASTOLIC BLOOD PRESSURE: 70 MMHG | TEMPERATURE: 96.9 F | WEIGHT: 294.38 LBS | BODY MASS INDEX: 39.02 KG/M2 | SYSTOLIC BLOOD PRESSURE: 110 MMHG | HEIGHT: 73 IN

## 2025-02-11 DIAGNOSIS — R10.10 UPPER ABDOMINAL PAIN: ICD-10-CM

## 2025-02-11 DIAGNOSIS — K80.20 GALLSTONES: Primary | ICD-10-CM

## 2025-02-11 PROCEDURE — 99213 OFFICE O/P EST LOW 20 MIN: CPT | Performed by: NURSE PRACTITIONER

## 2025-02-11 ASSESSMENT — ENCOUNTER SYMPTOMS
SORE THROAT: 0
VOMITING: 0
ABDOMINAL PAIN: 0
NAUSEA: 1
COUGH: 0
RHINORRHEA: 1
DIARRHEA: 0

## 2025-02-11 ASSESSMENT — PATIENT HEALTH QUESTIONNAIRE - PHQ9
SUM OF ALL RESPONSES TO PHQ9 QUESTIONS 1 & 2: 0
SUM OF ALL RESPONSES TO PHQ QUESTIONS 1-9: 0
1. LITTLE INTEREST OR PLEASURE IN DOING THINGS: NOT AT ALL
SUM OF ALL RESPONSES TO PHQ QUESTIONS 1-9: 0
2. FEELING DOWN, DEPRESSED OR HOPELESS: NOT AT ALL
SUM OF ALL RESPONSES TO PHQ QUESTIONS 1-9: 0
SUM OF ALL RESPONSES TO PHQ QUESTIONS 1-9: 0

## 2025-02-11 NOTE — PROGRESS NOTES
João Briones (:  1980) is a 44 y.o. male,Established patient, here for evaluation of the following chief complaint(s):  Abdominal Pain      ASSESSMENT/PLAN:    ICD-10-CM    1. Gallstones  K80.20 US GALLBLADDER RUQ      2. Upper abdominal pain  R10.10 US GALLBLADDER RUQ          Return if symptoms worsen or fail to improve.    SUBJECTIVE/OBJECTIVE:  HPI    ABDOMINAL PAIN:  He went into the ED on 2025.  \"That was the worse pain I have ever felt.\"  Reported bilateral upper abdominal pain on 2025   The abdominal pain has currently resolved.  Reports nausea if he eats  Denies vomiting and diarrhea    Creat: 0.9  Bun: 8  Sodium: 141  Potassium: 3.4  Lipase: 54  Amylase: 42  WBC: 10.6  Hgb: 15.7  Hct: 47.9    CT scan abdomen, 2025:  GB is distended containing sludge and small stones.  No pericholecystic inflammatory changes are noted.    /70 (Site: Right Upper Arm, Position: Sitting, Cuff Size: Large Adult)   Pulse 87   Temp 96.9 °F (36.1 °C) (Temporal)   Ht 1.854 m (6' 1\")   Wt 133.5 kg (294 lb 6 oz)   SpO2 97%   BMI 38.84 kg/m²     Review of Systems   Constitutional:  Positive for appetite change (decreased). Negative for fever.   HENT:  Positive for congestion and rhinorrhea. Negative for sore throat.    Respiratory:  Negative for cough.    Gastrointestinal:  Positive for nausea (\"when I eat\"). Negative for abdominal pain (resolved), diarrhea and vomiting.       Physical Exam  Vitals reviewed.   Constitutional:       Appearance: He is well-developed. He is obese.   HENT:      Head: Normocephalic.      Right Ear: Tympanic membrane, ear canal and external ear normal.      Left Ear: Tympanic membrane, ear canal and external ear normal.      Nose: Nose normal.   Cardiovascular:      Rate and Rhythm: Normal rate and regular rhythm.   Pulmonary:      Effort: Pulmonary effort is normal.      Breath sounds: Normal breath sounds. No wheezing, rhonchi or rales.   Abdominal:      General:

## 2025-02-12 ENCOUNTER — HOSPITAL ENCOUNTER (OUTPATIENT)
Dept: GENERAL RADIOLOGY | Age: 45
Discharge: HOME | End: 2025-02-12
Payer: COMMERCIAL

## 2025-02-12 DIAGNOSIS — K80.20 GALLSTONES: ICD-10-CM

## 2025-02-12 DIAGNOSIS — R10.10 UPPER ABDOMINAL PAIN: ICD-10-CM

## 2025-02-12 PROCEDURE — 76705 ECHO EXAM OF ABDOMEN: CPT

## 2025-02-26 ENCOUNTER — TELEPHONE (OUTPATIENT)
Age: 45
End: 2025-02-26

## 2025-02-26 NOTE — TELEPHONE ENCOUNTER
Patient called to check on results of RUQ Gallbladder US. I let him know that it was still in process and as soon as it was released and resulted by Lyndsay we would give him a call.

## 2025-03-06 ENCOUNTER — TELEPHONE (OUTPATIENT)
Age: 45
End: 2025-03-06

## 2025-03-06 NOTE — TELEPHONE ENCOUNTER
Patient was calling about his ultrasound results, these are not back yet  Apologized to patient and emailed radiology to see about getting results expedited

## 2025-03-06 NOTE — TELEPHONE ENCOUNTER
Patient called francising Cielo with Lyn[facility] Atkinson give him a call. He would not tell me what this was about.     Will send to clinic.

## 2025-03-07 ENCOUNTER — TELEPHONE (OUTPATIENT)
Age: 45
End: 2025-03-07

## 2025-03-07 DIAGNOSIS — K80.20 GALLSTONES: Primary | ICD-10-CM

## 2025-03-07 NOTE — TELEPHONE ENCOUNTER
Carlin Sarmiento APRN [facility] Clinical Staff  Please call patient and let them know results.  Gallbladder ultrasound shows gallstones with gallbladder sludge.  Recommend referral to Dr. Selby general surgery

## 2025-03-07 NOTE — TELEPHONE ENCOUNTER
Called patient, spoke with: Patient regarding the results of the patients most recent US.  I advised Patient of Ramiro Sarmiento recommendations.   Patient did voice understanding    Referral entered for pt

## 2025-03-17 ENCOUNTER — PREP FOR PROCEDURE (OUTPATIENT)
Dept: SURGERY | Age: 45
End: 2025-03-17

## 2025-03-17 ENCOUNTER — OFFICE VISIT (OUTPATIENT)
Dept: SURGERY | Age: 45
End: 2025-03-17
Payer: COMMERCIAL

## 2025-03-17 VITALS — HEIGHT: 73 IN | OXYGEN SATURATION: 98 % | WEIGHT: 291 LBS | BODY MASS INDEX: 38.57 KG/M2 | TEMPERATURE: 98 F

## 2025-03-17 DIAGNOSIS — K80.20 GALLSTONES: ICD-10-CM

## 2025-03-17 DIAGNOSIS — K80.20 CALCULUS OF GALLBLADDER WITHOUT CHOLECYSTITIS WITHOUT OBSTRUCTION: Primary | ICD-10-CM

## 2025-03-17 DIAGNOSIS — G47.33 OBSTRUCTIVE SLEEP APNEA SYNDROME: ICD-10-CM

## 2025-03-17 PROCEDURE — 99204 OFFICE O/P NEW MOD 45 MIN: CPT | Performed by: SURGERY

## 2025-03-17 NOTE — PROGRESS NOTES
sludge        ______________________________________   Electronically signed by: STACEY CRUZ M.D.  Date:     03/07/2025  Time:    08:50        Labs  Lab Results   Component Value Date    WBC 4.6 (L) 01/15/2025    HGB 13.9 (L) 01/15/2025    HCT 43.5 01/15/2025    MCV 84.5 01/15/2025     01/15/2025      Lab Results   Component Value Date     01/15/2025    K 3.7 01/15/2025     01/15/2025    CO2 27 01/15/2025    BUN 8 01/15/2025    CREATININE 0.8 01/15/2025    GLUCOSE 80 01/15/2025    CALCIUM 9.2 01/15/2025    BILITOT 0.7 01/15/2025    ALKPHOS 117 01/15/2025    AST 19 01/15/2025    ALT 17 01/15/2025    LABGLOM >90 01/15/2025    GFRAA >59 12/03/2021          Assessment  1. Calculus of gallbladder without cholecystitis without obstruction  2. Obstructive sleep apnea syndrome       Plan  The patient was explained the need for a laparoscopic versus open cholecystectomy.  They were explained the risks benefits and alternatives which include but are not limited to bleeding, infection, DVT/PE, stroke, MI and bile duct injury. Treatment of some of these may require further testing, medical treatment and/or additional surgical intervention.   The patient understands and would like to proceed with surgery.    Sleep apnea can increase pulmonary complications after surgery.    This dictation was generated by voice recognition computer software. Although all attempts are made to edit the dictation for accuracy, there may be errors in the transcription that are not intended.    Electronically signed by Abdulaziz Galvan MD on 3/17/2025 at 11:01 AM

## 2025-03-18 RX ORDER — SODIUM CHLORIDE 9 MG/ML
INJECTION, SOLUTION INTRAVENOUS PRN
Status: CANCELLED | OUTPATIENT
Start: 2025-03-18

## 2025-03-18 RX ORDER — SODIUM CHLORIDE, SODIUM LACTATE, POTASSIUM CHLORIDE, CALCIUM CHLORIDE 600; 310; 30; 20 MG/100ML; MG/100ML; MG/100ML; MG/100ML
INJECTION, SOLUTION INTRAVENOUS CONTINUOUS
Status: CANCELLED | OUTPATIENT
Start: 2025-03-18

## 2025-03-18 RX ORDER — SODIUM CHLORIDE 0.9 % (FLUSH) 0.9 %
5-40 SYRINGE (ML) INJECTION PRN
Status: CANCELLED | OUTPATIENT
Start: 2025-03-18

## 2025-03-18 RX ORDER — SODIUM CHLORIDE 0.9 % (FLUSH) 0.9 %
5-40 SYRINGE (ML) INJECTION EVERY 12 HOURS SCHEDULED
Status: CANCELLED | OUTPATIENT
Start: 2025-03-18

## 2025-03-28 ENCOUNTER — HOSPITAL ENCOUNTER (OUTPATIENT)
Dept: PREADMISSION TESTING | Age: 45
Discharge: HOME | End: 2025-04-01
Payer: COMMERCIAL

## 2025-03-28 VITALS — BODY MASS INDEX: 39.28 KG/M2 | WEIGHT: 290 LBS | HEIGHT: 72 IN

## 2025-03-28 LAB
ALBUMIN SERPL-MCNC: 4.2 G/DL (ref 3.5–5.2)
ALP SERPL-CCNC: 117 U/L (ref 40–129)
ALT SERPL-CCNC: 25 U/L (ref 10–50)
ANION GAP SERPL CALCULATED.3IONS-SCNC: 10 MMOL/L (ref 8–16)
AST SERPL-CCNC: 42 U/L (ref 10–50)
BILIRUB SERPL-MCNC: 0.6 MG/DL (ref 0.2–1.2)
BUN SERPL-MCNC: 10 MG/DL (ref 6–20)
CALCIUM SERPL-MCNC: 9.2 MG/DL (ref 8.6–10)
CHLORIDE SERPL-SCNC: 102 MMOL/L (ref 98–107)
CO2 SERPL-SCNC: 26 MMOL/L (ref 22–29)
CREAT SERPL-MCNC: 0.8 MG/DL (ref 0.7–1.2)
ERYTHROCYTE [DISTWIDTH] IN BLOOD BY AUTOMATED COUNT: 14 % (ref 11.5–14.5)
GLUCOSE SERPL-MCNC: 71 MG/DL (ref 70–99)
HCT VFR BLD AUTO: 42 % (ref 42–52)
HGB BLD-MCNC: 13.7 G/DL (ref 14–18)
MCH RBC QN AUTO: 27.8 PG (ref 27–31)
MCHC RBC AUTO-ENTMCNC: 32.6 G/DL (ref 33–37)
MCV RBC AUTO: 85.4 FL (ref 80–94)
PLATELET # BLD AUTO: 239 K/UL (ref 130–400)
PMV BLD AUTO: 10.6 FL (ref 9.4–12.4)
POTASSIUM SERPL-SCNC: 4.5 MMOL/L (ref 3.5–5.1)
PROT SERPL-MCNC: 6.4 G/DL (ref 6.4–8.3)
RBC # BLD AUTO: 4.92 M/UL (ref 4.7–6.1)
SODIUM SERPL-SCNC: 138 MMOL/L (ref 136–145)
WBC # BLD AUTO: 4.9 K/UL (ref 4.8–10.8)

## 2025-03-28 PROCEDURE — 85027 COMPLETE CBC AUTOMATED: CPT

## 2025-03-28 PROCEDURE — 80053 COMPREHEN METABOLIC PANEL: CPT

## 2025-03-28 PROCEDURE — 93005 ELECTROCARDIOGRAM TRACING: CPT | Performed by: ANESTHESIOLOGY

## 2025-03-28 RX ORDER — IBUPROFEN 200 MG
200 TABLET ORAL EVERY 6 HOURS PRN
COMMUNITY

## 2025-03-28 NOTE — DISCHARGE INSTRUCTIONS
The day before surgery you will receive a phone call from the surgery nurse to let you know what time to arrive on the day of surgery. This call will usually be between 2-4 PM. If you do not receive a phone call by 4 PM the day before your surgery please call [phone] and let them know you have not received an arrival time. If your surgery is on Monday, your call will be on the Friday before your Monday surgery. Please check your voicemail as they may leave a message with that information.  If you are running late or wake up sick the day of surgery please call the above number for further instructions.    Chlorhexidine Gluconate 4% Solution    Patient should shower with this soap a minimum of 3 consecutive showers (2 nights before surgery, the night before surgery and the morning of surgery) washing from the neck down (avoiding contact with genitalia).      DO NOT WASH YOUR HAIR OR FACE WITH THIS SOAP.  When washing with this soap, apply enough to suds up the body thoroughly, turn the water away from your body and allow the soap suds to remain on the body for 2 full minutes, then rinse body completely.      After using this soap on the body, please do not apply powders or lotions to your body.  After the shower the night before surgery, please dry off with a new towel, sleep in new freshly laundered pj's, and change your bed linen before going to sleep.      IF YOU HAVE A PET IN YOUR HOME, please do not allow your pet to sleep in the bed with you after you have showered with your surgery prep soap.     Please remember that it is not recommended to allow your pet to sleep with you post op, until your incision has healed.  This can increase your risk of post op infection.      Hold your prescribed GLP-1 Receptor   wegovy         for 1 week prior to surgery. This medication can delay gastric emptying, potentially increasing gastric volumes and increasing the risk of aspiration during anesthesia.  Last dose

## 2025-03-30 LAB
EKG P AXIS: 19 DEGREES
EKG P-R INTERVAL: 126 MS
EKG Q-T INTERVAL: 396 MS
EKG QRS DURATION: 96 MS
EKG QTC CALCULATION (BAZETT): 415 MS
EKG T AXIS: 15 DEGREES

## 2025-03-30 PROCEDURE — 93010 ELECTROCARDIOGRAM REPORT: CPT | Performed by: INTERNAL MEDICINE

## 2025-04-11 ENCOUNTER — ANESTHESIA (OUTPATIENT)
Dept: OPERATING ROOM | Age: 45
End: 2025-04-11
Payer: COMMERCIAL

## 2025-04-11 ENCOUNTER — ANESTHESIA EVENT (OUTPATIENT)
Dept: OPERATING ROOM | Age: 45
End: 2025-04-11
Payer: COMMERCIAL

## 2025-04-11 ENCOUNTER — HOSPITAL ENCOUNTER (OUTPATIENT)
Age: 45
Setting detail: OUTPATIENT SURGERY
Discharge: HOME | End: 2025-04-11
Attending: SURGERY | Admitting: SURGERY
Payer: COMMERCIAL

## 2025-04-11 VITALS
DIASTOLIC BLOOD PRESSURE: 89 MMHG | RESPIRATION RATE: 12 BRPM | HEIGHT: 73 IN | BODY MASS INDEX: 37.77 KG/M2 | TEMPERATURE: 96.9 F | OXYGEN SATURATION: 94 % | SYSTOLIC BLOOD PRESSURE: 126 MMHG | WEIGHT: 285 LBS | HEART RATE: 64 BPM

## 2025-04-11 DIAGNOSIS — K80.20 GALLSTONES: ICD-10-CM

## 2025-04-11 DIAGNOSIS — K80.20 CALCULUS OF GALLBLADDER WITHOUT CHOLECYSTITIS WITHOUT OBSTRUCTION: Primary | ICD-10-CM

## 2025-04-11 PROCEDURE — 6360000002 HC RX W HCPCS: Performed by: SURGERY

## 2025-04-11 PROCEDURE — 3600000014 HC SURGERY LEVEL 4 ADDTL 15MIN: Performed by: SURGERY

## 2025-04-11 PROCEDURE — 3700000000 HC ANESTHESIA ATTENDED CARE: Performed by: SURGERY

## 2025-04-11 PROCEDURE — 7100000001 HC PACU RECOVERY - ADDTL 15 MIN: Performed by: SURGERY

## 2025-04-11 PROCEDURE — 6360000002 HC RX W HCPCS: Performed by: ANESTHESIOLOGY

## 2025-04-11 PROCEDURE — 7100000011 HC PHASE II RECOVERY - ADDTL 15 MIN: Performed by: SURGERY

## 2025-04-11 PROCEDURE — 6360000002 HC RX W HCPCS: Performed by: NURSE ANESTHETIST, CERTIFIED REGISTERED

## 2025-04-11 PROCEDURE — 3600000004 HC SURGERY LEVEL 4 BASE: Performed by: SURGERY

## 2025-04-11 PROCEDURE — 7100000010 HC PHASE II RECOVERY - FIRST 15 MIN: Performed by: SURGERY

## 2025-04-11 PROCEDURE — 47562 LAPAROSCOPIC CHOLECYSTECTOMY: CPT | Performed by: SURGERY

## 2025-04-11 PROCEDURE — 2580000003 HC RX 258: Performed by: SURGERY

## 2025-04-11 PROCEDURE — 2709999900 HC NON-CHARGEABLE SUPPLY: Performed by: SURGERY

## 2025-04-11 PROCEDURE — 88304 TISSUE EXAM BY PATHOLOGIST: CPT

## 2025-04-11 PROCEDURE — 3700000001 HC ADD 15 MINUTES (ANESTHESIA): Performed by: SURGERY

## 2025-04-11 PROCEDURE — 2500000003 HC RX 250 WO HCPCS: Performed by: NURSE ANESTHETIST, CERTIFIED REGISTERED

## 2025-04-11 PROCEDURE — 7100000000 HC PACU RECOVERY - FIRST 15 MIN: Performed by: SURGERY

## 2025-04-11 RX ORDER — LIDOCAINE HYDROCHLORIDE 10 MG/ML
INJECTION, SOLUTION INFILTRATION; PERINEURAL
Status: DISCONTINUED | OUTPATIENT
Start: 2025-04-11 | End: 2025-04-11 | Stop reason: SDUPTHER

## 2025-04-11 RX ORDER — HYDROMORPHONE HYDROCHLORIDE 1 MG/ML
0.25 INJECTION, SOLUTION INTRAMUSCULAR; INTRAVENOUS; SUBCUTANEOUS EVERY 5 MIN PRN
Status: DISCONTINUED | OUTPATIENT
Start: 2025-04-11 | End: 2025-04-11 | Stop reason: HOSPADM

## 2025-04-11 RX ORDER — SODIUM CHLORIDE 0.9 % (FLUSH) 0.9 %
5-40 SYRINGE (ML) INJECTION PRN
Status: DISCONTINUED | OUTPATIENT
Start: 2025-04-11 | End: 2025-04-11 | Stop reason: HOSPADM

## 2025-04-11 RX ORDER — DIPHENHYDRAMINE HYDROCHLORIDE 50 MG/ML
12.5 INJECTION, SOLUTION INTRAMUSCULAR; INTRAVENOUS
Status: DISCONTINUED | OUTPATIENT
Start: 2025-04-11 | End: 2025-04-11 | Stop reason: HOSPADM

## 2025-04-11 RX ORDER — ONDANSETRON 2 MG/ML
INJECTION INTRAMUSCULAR; INTRAVENOUS
Status: DISCONTINUED | OUTPATIENT
Start: 2025-04-11 | End: 2025-04-11 | Stop reason: SDUPTHER

## 2025-04-11 RX ORDER — FENTANYL CITRATE 50 UG/ML
INJECTION, SOLUTION INTRAMUSCULAR; INTRAVENOUS
Status: DISCONTINUED | OUTPATIENT
Start: 2025-04-11 | End: 2025-04-11 | Stop reason: SDUPTHER

## 2025-04-11 RX ORDER — DEXAMETHASONE SODIUM PHOSPHATE 10 MG/ML
INJECTION, SOLUTION INTRAMUSCULAR; INTRAVENOUS
Status: DISCONTINUED | OUTPATIENT
Start: 2025-04-11 | End: 2025-04-11 | Stop reason: SDUPTHER

## 2025-04-11 RX ORDER — MIDAZOLAM HYDROCHLORIDE 1 MG/ML
INJECTION, SOLUTION INTRAMUSCULAR; INTRAVENOUS
Status: DISCONTINUED | OUTPATIENT
Start: 2025-04-11 | End: 2025-04-11 | Stop reason: SDUPTHER

## 2025-04-11 RX ORDER — CIPROFLOXACIN 2 MG/ML
400 INJECTION, SOLUTION INTRAVENOUS
Status: DISCONTINUED | OUTPATIENT
Start: 2025-04-11 | End: 2025-04-11 | Stop reason: HOSPADM

## 2025-04-11 RX ORDER — IPRATROPIUM BROMIDE AND ALBUTEROL SULFATE 2.5; .5 MG/3ML; MG/3ML
1 SOLUTION RESPIRATORY (INHALATION)
Status: DISCONTINUED | OUTPATIENT
Start: 2025-04-11 | End: 2025-04-11 | Stop reason: HOSPADM

## 2025-04-11 RX ORDER — MEPERIDINE HYDROCHLORIDE 25 MG/ML
12.5 INJECTION INTRAMUSCULAR; INTRAVENOUS; SUBCUTANEOUS
Status: DISCONTINUED | OUTPATIENT
Start: 2025-04-11 | End: 2025-04-11 | Stop reason: HOSPADM

## 2025-04-11 RX ORDER — METRONIDAZOLE 500 MG/100ML
500 INJECTION, SOLUTION INTRAVENOUS
Status: COMPLETED | OUTPATIENT
Start: 2025-04-11 | End: 2025-04-11

## 2025-04-11 RX ORDER — SODIUM CHLORIDE 9 MG/ML
INJECTION, SOLUTION INTRAVENOUS PRN
Status: DISCONTINUED | OUTPATIENT
Start: 2025-04-11 | End: 2025-04-11 | Stop reason: HOSPADM

## 2025-04-11 RX ORDER — LABETALOL HYDROCHLORIDE 5 MG/ML
10 INJECTION, SOLUTION INTRAVENOUS
Status: DISCONTINUED | OUTPATIENT
Start: 2025-04-11 | End: 2025-04-11 | Stop reason: HOSPADM

## 2025-04-11 RX ORDER — OXYCODONE AND ACETAMINOPHEN 5; 325 MG/1; MG/1
1 TABLET ORAL EVERY 6 HOURS PRN
Qty: 12 TABLET | Refills: 0 | Status: SHIPPED | OUTPATIENT
Start: 2025-04-11 | End: 2025-04-14

## 2025-04-11 RX ORDER — NALOXONE HYDROCHLORIDE 0.4 MG/ML
INJECTION, SOLUTION INTRAMUSCULAR; INTRAVENOUS; SUBCUTANEOUS PRN
Status: DISCONTINUED | OUTPATIENT
Start: 2025-04-11 | End: 2025-04-11 | Stop reason: HOSPADM

## 2025-04-11 RX ORDER — HYDROMORPHONE HYDROCHLORIDE 1 MG/ML
0.5 INJECTION, SOLUTION INTRAMUSCULAR; INTRAVENOUS; SUBCUTANEOUS EVERY 5 MIN PRN
Status: DISCONTINUED | OUTPATIENT
Start: 2025-04-11 | End: 2025-04-11 | Stop reason: HOSPADM

## 2025-04-11 RX ORDER — PROPOFOL 10 MG/ML
INJECTION, EMULSION INTRAVENOUS
Status: DISCONTINUED | OUTPATIENT
Start: 2025-04-11 | End: 2025-04-11 | Stop reason: SDUPTHER

## 2025-04-11 RX ORDER — HYDRALAZINE HYDROCHLORIDE 20 MG/ML
10 INJECTION INTRAMUSCULAR; INTRAVENOUS
Status: DISCONTINUED | OUTPATIENT
Start: 2025-04-11 | End: 2025-04-11 | Stop reason: HOSPADM

## 2025-04-11 RX ORDER — LIDOCAINE HYDROCHLORIDE 10 MG/ML
1 INJECTION, SOLUTION EPIDURAL; INFILTRATION; INTRACAUDAL; PERINEURAL
Status: DISCONTINUED | OUTPATIENT
Start: 2025-04-11 | End: 2025-04-11 | Stop reason: HOSPADM

## 2025-04-11 RX ORDER — SODIUM CHLORIDE, SODIUM LACTATE, POTASSIUM CHLORIDE, CALCIUM CHLORIDE 600; 310; 30; 20 MG/100ML; MG/100ML; MG/100ML; MG/100ML
INJECTION, SOLUTION INTRAVENOUS CONTINUOUS
Status: DISCONTINUED | OUTPATIENT
Start: 2025-04-11 | End: 2025-04-11 | Stop reason: HOSPADM

## 2025-04-11 RX ORDER — ROCURONIUM BROMIDE 10 MG/ML
INJECTION, SOLUTION INTRAVENOUS
Status: DISCONTINUED | OUTPATIENT
Start: 2025-04-11 | End: 2025-04-11 | Stop reason: SDUPTHER

## 2025-04-11 RX ORDER — SODIUM CHLORIDE 0.9 % (FLUSH) 0.9 %
5-40 SYRINGE (ML) INJECTION EVERY 12 HOURS SCHEDULED
Status: DISCONTINUED | OUTPATIENT
Start: 2025-04-11 | End: 2025-04-11 | Stop reason: HOSPADM

## 2025-04-11 RX ORDER — PROCHLORPERAZINE EDISYLATE 5 MG/ML
5 INJECTION INTRAMUSCULAR; INTRAVENOUS
Status: DISCONTINUED | OUTPATIENT
Start: 2025-04-11 | End: 2025-04-11 | Stop reason: HOSPADM

## 2025-04-11 RX ORDER — BUPIVACAINE HYDROCHLORIDE 2.5 MG/ML
INJECTION, SOLUTION INFILTRATION; PERINEURAL PRN
Status: DISCONTINUED | OUTPATIENT
Start: 2025-04-11 | End: 2025-04-11 | Stop reason: HOSPADM

## 2025-04-11 RX ADMIN — ROCURONIUM BROMIDE 50 MG: 10 INJECTION, SOLUTION INTRAVENOUS at 09:24

## 2025-04-11 RX ADMIN — SODIUM CHLORIDE, SODIUM LACTATE, POTASSIUM CHLORIDE, AND CALCIUM CHLORIDE: 600; 310; 30; 20 INJECTION, SOLUTION INTRAVENOUS at 10:01

## 2025-04-11 RX ADMIN — HYDROMORPHONE HYDROCHLORIDE 0.5 MG: 1 INJECTION, SOLUTION INTRAMUSCULAR; INTRAVENOUS; SUBCUTANEOUS at 10:44

## 2025-04-11 RX ADMIN — PROPOFOL 250 MG: 10 INJECTION, EMULSION INTRAVENOUS at 09:23

## 2025-04-11 RX ADMIN — LIDOCAINE HYDROCHLORIDE 50 MG: 10 INJECTION, SOLUTION INFILTRATION; PERINEURAL at 09:22

## 2025-04-11 RX ADMIN — SUGAMMADEX 200 MG: 100 INJECTION, SOLUTION INTRAVENOUS at 10:13

## 2025-04-11 RX ADMIN — FENTANYL CITRATE 50 MCG: 0.05 INJECTION, SOLUTION INTRAMUSCULAR; INTRAVENOUS at 09:40

## 2025-04-11 RX ADMIN — DEXAMETHASONE SODIUM PHOSPHATE 8 MG: 10 INJECTION, SOLUTION INTRAMUSCULAR; INTRAVENOUS at 09:32

## 2025-04-11 RX ADMIN — MIDAZOLAM 2 MG: 1 INJECTION INTRAMUSCULAR; INTRAVENOUS at 09:14

## 2025-04-11 RX ADMIN — SODIUM CHLORIDE, SODIUM LACTATE, POTASSIUM CHLORIDE, AND CALCIUM CHLORIDE: 600; 310; 30; 20 INJECTION, SOLUTION INTRAVENOUS at 07:45

## 2025-04-11 RX ADMIN — HYDROMORPHONE HYDROCHLORIDE 1 MG: 1 INJECTION, SOLUTION INTRAMUSCULAR; INTRAVENOUS; SUBCUTANEOUS at 10:22

## 2025-04-11 RX ADMIN — FENTANYL CITRATE 50 MCG: 0.05 INJECTION, SOLUTION INTRAMUSCULAR; INTRAVENOUS at 09:30

## 2025-04-11 RX ADMIN — ONDANSETRON 4 MG: 2 INJECTION INTRAMUSCULAR; INTRAVENOUS at 09:35

## 2025-04-11 RX ADMIN — CIPROFLOXACIN 400 MG: 2 INJECTION INTRAVENOUS at 09:14

## 2025-04-11 RX ADMIN — FENTANYL CITRATE 50 MCG: 0.05 INJECTION, SOLUTION INTRAMUSCULAR; INTRAVENOUS at 10:21

## 2025-04-11 RX ADMIN — METRONIDAZOLE 500 MG: 500 INJECTION, SOLUTION INTRAVENOUS at 07:45

## 2025-04-11 RX ADMIN — FENTANYL CITRATE 50 MCG: 0.05 INJECTION, SOLUTION INTRAMUSCULAR; INTRAVENOUS at 09:22

## 2025-04-11 ASSESSMENT — PAIN - FUNCTIONAL ASSESSMENT
PAIN_FUNCTIONAL_ASSESSMENT: PREVENTS OR INTERFERES SOME ACTIVE ACTIVITIES AND ADLS
PAIN_FUNCTIONAL_ASSESSMENT: NONE - DENIES PAIN
PAIN_FUNCTIONAL_ASSESSMENT: NONE - DENIES PAIN
PAIN_FUNCTIONAL_ASSESSMENT: 0-10

## 2025-04-11 ASSESSMENT — PAIN SCALES - GENERAL: PAINLEVEL_OUTOF10: 7

## 2025-04-11 ASSESSMENT — LIFESTYLE VARIABLES: SMOKING_STATUS: 0

## 2025-04-11 ASSESSMENT — PAIN DESCRIPTION - DESCRIPTORS: DESCRIPTORS: ACHING

## 2025-04-11 ASSESSMENT — PAIN DESCRIPTION - LOCATION: LOCATION: ABDOMEN

## 2025-04-11 NOTE — H&P
Update History & Physical    The patient's History and Physical of March 17, 2025 was reviewed with the patient and I examined the patient. There was no change     [facility]  [facility]  [address]  [facility] [id number] [phone]     Patient: João Briones   YOB: 1980   Date: 3/17/2025         History of Present Illness  Chief Complaint   Patient presents with    New Patient       This is a 44 y.o. male presents today complaining of gallstones.  He states that approximately 1-1/2 months ago he ate dinner and approximately 2 hours later he had right upper quadrant and epigastric pain sharp stabbing and colicky.  It radiated to his right back.  There is also bloating and nausea.  He went to an outside ER and a CAT scan was performed that demonstrated gallstones. he had a right upper quadrant ultrasound in February 2025 that demonstrated gallstones.  He had liver function test in January 2025 which were normal    I have reviewed the patient's chart including past visits, office notes, hospital reports, procedures, tests, labs, medications, and other reports.     Past Medical History:   Diagnosis Date    Hypertension     Obesity     Sleep apnea       Past Surgical History:   Procedure Laterality Date    GASTRIC BYPASS SURGERY        Social History     Socioeconomic History    Marital status: Single     Spouse name: None    Number of children: None    Years of education: None    Highest education level: None   Tobacco Use    Smoking status: Never    Smokeless tobacco: Never   Substance and Sexual Activity    Alcohol use: Yes     Alcohol/week: 4.0 - 5.0 standard drinks of alcohol     Types: 4 - 5 Cans of beer per week     Comment: rarely    Drug use: No     Social Drivers of Health     Food Insecurity: No Food Insecurity (1/14/2025)    Hunger Vital Sign     Worried About Running Out of Food in the Last Year: Never true     Ran Out of Food in the Last

## 2025-04-11 NOTE — PROGRESS NOTES
CLINICAL PHARMACY NOTE: MEDS TO BEDS    Total # of Prescriptions Filled: 1   The following medications were delivered to the patient:  Discharge Medication List as of 4/11/2025 11:19 AM        START taking these medications    Details   oxyCODONE-acetaminophen (PERCOCET) 5-325 MG per tablet Take 1 tablet by mouth every 6 hours as needed for Pain for up to 3 days. Intended supply: 3 days. Take lowest dose possible to manage pain Max Daily Amount: 4 tablets, Disp-12 tablet, R-0Normal               Additional Documentation:    Patient family  picked up RX grant at pharmacy. Paid with cash.

## 2025-04-11 NOTE — OP NOTE
[facility] General Surgery    Patient ID: João Briones  44 y.o.  male  YOB: 1980      Brief Operative Report    Pre-operative Diagnosis: Gallstones    Post-operative Diagnosis: Same    Procedure: Laparoscopic cholecystectomy    Surgeon:  Abdulaziz Galvan MD       Anesthesia: General    Findings: Findings:  Infection Present At Time Of Surgery (PATOS) (choose all levels that have infection present):  No infection present  Other Findings: Distended gallbladder    Estimated blood loss: 2 ml    Specimens: Gallbladder    Complications:  none    Condition:  stable        See dictated operative report for full det

## 2025-04-11 NOTE — OP NOTE
[facility] General Surgery Operative Note    Patient ID: João Briones  44 y.o.  male  YOB: 1980        NAME OF SURGEON: Abdulaziz Galvan MD     DATE OF SERVICE: 4/11/2025    PREOPERATIVE DIAGNOSIS  Calculus disease of the gallbladder without cholecystitis without obstruction    POSTOPERATIVE DIAGNOSIS  Same    PROCEDURE  Laparoscopic cholecystectomy    SURGEON  Abdulaziz Galvan MD       SPECIMEN:  Gallbladder    INDICATIONS  44-year-old man with symptomatic cholelithiasis    *Findings:  Infection Present At Time Of Surgery (PATOS) (choose all levels that have infection present):  No infection present  Other Findings: Distended gallbladder      PROCEDURE  After informed consent was obtained the patient brought the operating room placed in supine position on the operating table.  After adequate anesthesia was achieved a timeout was performed which identified the patient's name number and procedure.  The patient's abdomen was then sterilely prepped and draped.    An infraumbilical area was chosen local anesthetic was injected and a small incision was made.  A Veress needle was inserted into the abdomen and confirmed to be in good position by no withdrawal of blood or succus and free for all of the column of fluid into the abdominal cavity.  It was then connected to carbon dioxide and we got an opening pressure of 3 mmHg.  We then insufflated to pneumoperitoneum of 10 mmHg.  We then inserted a 5 mm port and performed a diagnostic laparoscopy did not identify any immediate abnormalities.    Then under direct laparoscopic visualization we placed a 10 mm port two fingerbreadths below the right costal margin in the midclavicular line and then another 5 mm port in the lateral clavicular line.  The patient was then positioned in reverse Trendelenburg and left side down.  We then identified a distended gallbladder.  The gallbladder was then grasped and lifted ventrally and laterally.    Using traction  countertraction and blunt dissection we peeled the fat off the gallbladder revealing 2 structures entering into the gallbladder.  This allowed us to identify the critical view.  We skeletonized these 2 structures and they were identified as the cystic duct and the cystic artery.  They are both doubly clipped and then cut.  Then using hook electrocautery the gallbladder was removed from the liver bed.  It was then placed in an Endo Catch bag.  We then checked the liver bed and found it to be hemostatic and the clips were in good position.    The gallbladder was removed within the Endo Catch bag through the 12 mm port site.  We then relieve pneumoperitoneum, remove the ports and closed the skin with a 4-0 Monocryl subcuticular stitch.  Glue was applied.  The patient was then awakened and transferred to the recovery room in stable condition.      Sponge, needle, instrument count correct at the end of the case.    Estimated intraoperative blood loss 2 mL.     Mr. Briones tolerated his surgery well and he was taken to PACU in  satisfactory condition.          ________________________________  Abdulaziz Galvan MD

## 2025-04-11 NOTE — ANESTHESIA PRE PROCEDURE
Department of Anesthesiology  Preprocedure Note       Name:  João Briones   Age:  44 y.o.  :  1980                                          MRN:  [id number]         Date:  2025      Surgeon: Surgeon(s):  Abdulaziz Galvan MD    Procedure: Procedure(s):  LAPAROSCOPIC VERSUS OPEN CHOLECYSTECTOMY    Medications prior to admission:   Prior to Admission medications    Medication Sig Start Date End Date Taking? Authorizing Provider   ibuprofen (ADVIL;MOTRIN) 200 MG tablet Take 1 tablet by mouth every 6 hours as needed for Pain   Yes Marques Rose MD   montelukast (SINGULAIR) 10 MG tablet Take 1 tablet by mouth nightly 25  Yes Lyndsay Atkinson APRN   hydrOXYzine HCl (ATARAX) 50 MG tablet TAKE 1 TABLET BY MOUTH EVERY 6 HOURS AS NEEDED FOR ANXIETY **MAY MAKE DROWSY**  Patient taking differently: Take 1 tablet by mouth every 4 hours as needed for Itching or Anxiety TAKE 1 TABLET BY MOUTH EVERY 6 HOURS AS NEEDED FOR ANXIETY **MAY MAKE DROWSY** 25  Yes Lyndsay Atkinson APRN   cetirizine (ZYRTEC) 10 MG tablet Take 1 tablet by mouth daily 25  Yes Lyndsay Atkinson APRN   fluticasone (FLONASE) 50 MCG/ACT nasal spray 2 sprays by Nasal route daily  Patient taking differently: 2 sprays by Nasal route daily as needed for Rhinitis or Allergies 25  Yes Lyndsay Atkinson APRN   methocarbamol (ROBAXIN) 500 MG tablet Take 1 tablet by mouth nightly as needed (back pain) 25  Yes Lyndsay Atkinson APRN   pantoprazole (PROTONIX) 40 MG tablet Take 1 tablet by mouth in the morning and at bedtime 24  Yes GINGER لاعلي DO   traZODone (DESYREL) 50 MG tablet TAKE 1 TO 2 TABLETS BY MOUTH EVERY NIGHT AT BEDTIME, MAY CAUSE DROWSINESS  Patient taking differently: Take 1 tablet by mouth nightly TAKE 1 TO 2 TABLETS BY MOUTH EVERY NIGHT AT BEDTIME, MAY CAUSE DROWSINESS 24  Yes Adal Cage MD   mupirocin (BACTROBAN) 2 % ointment Apply topically 3 times daily.  Patient taking differently: Apply 1

## 2025-04-11 NOTE — DISCHARGE INSTRUCTIONS
[facility]  General Surgery/Colorectal Clinic  [phone]    POST CHOLECYSTECTOMY INSTRUCTIONS      1. Liquids for 24 hours then resume regular diet    2. Increase activity daily, consistent with comfort.    3. Motrin/Ibuprofen 800 mg three time for pain not to exceed 7 days. Do not drive if taking narcotic pain medicine    4. Strenuous activity or lifting anything over 15 lbs. should be avoided for up to six to eight weeks.    5. Swelling and bruising in the region of the operation can be expected.    6. Black and blue marks at site is also common.    7. Outer dressings may be removed in 48 hours if there is no glue applied to the incison. Showering is allowed 48 hrs after  surgery. No swimming or bathing for 2 weeks    8. Milk of Magnesia or Miralax can be used if constipation becomes a problem (If no bowel movement in 48 hrs)    9. Ice pack to operative site, on 20 minutes, off 20 minutes, for 24 hours.    ACTIVITY: You will need to rest for the remainder of the surgery day   DO NOT drive or operate machinery for 24 hours after anesthesia   DO NOT sign legal documents for 24 hours after your surgery   You can resume normal activities within 24 - 48 hours..    TEMPERATURE: An elevated temperature above 101.5 associated with chills should  be reported TO YOUR DOCTOR.    INCISION: report any signs of infection around the incision, redness, swelling, drainage.      You may have some abdominal or shoulder pain. This is from the carbon dioxide gas used during the surgery to  inflate your abdomen so your doctor can see through the scopes. Laying flat will help decrease the pain.

## 2025-04-11 NOTE — ANESTHESIA POSTPROCEDURE EVALUATION
Department of Anesthesiology  Postprocedure Note    Patient: João Briones  MRN: [id number]  YOB: 1980  Date of evaluation: 4/11/2025    Procedure Summary       Date: 04/11/25 Room / Location: [address]    Anesthesia Start: 0914 Anesthesia Stop: 1029    Procedure: LAPAROSCOPIC CHOLECYSTECTOMY (Abdomen) Diagnosis:       Gallstones      (Gallstones [K80.20])    Surgeons: Abdulaziz Galvan MD Responsible Provider: Sybil Ervin APRN - CRNA    Anesthesia Type: general ASA Status: 2            Anesthesia Type: No value filed.    Porsche Phase I: Porsche Score: 10    Porsche Phase II:      Anesthesia Post Evaluation    Patient location during evaluation: PACU  Patient participation: complete - patient participated  Level of consciousness: sleepy but conscious  Pain score: 2  Airway patency: patent  Nausea & Vomiting: no nausea and no vomiting  Cardiovascular status: hemodynamically stable and blood pressure returned to baseline  Respiratory status: acceptable and room air  Hydration status: stable  Comments: /74   Pulse 76   Temp 97.3 °F (36.3 °C) (Temporal)   Resp 18   Ht 1.854 m (6' 1\")   Wt 129.3 kg (285 lb)   SpO2 98%   BMI 37.60 kg/m²     Pain management: adequate    No notable events documented.

## 2025-04-23 DIAGNOSIS — E66.813 CLASS 3 SEVERE OBESITY DUE TO EXCESS CALORIES WITHOUT SERIOUS COMORBIDITY WITH BODY MASS INDEX (BMI) OF 45.0 TO 49.9 IN ADULT (HCC): ICD-10-CM

## 2025-04-23 RX ORDER — SEMAGLUTIDE 1.7 MG/.75ML
1.7 INJECTION, SOLUTION SUBCUTANEOUS
Qty: 3 ML | Refills: 5 | Status: SHIPPED | OUTPATIENT
Start: 2025-04-23

## 2025-04-23 NOTE — TELEPHONE ENCOUNTER
Received fax from pharmacy requesting refill on pts medication(s). Pt was last seen in office on 2/11/2025  and has a follow up scheduled for 7/15/2025. Will send request to  Alejandra Atkinson  for authorization.     Requested Prescriptions     Pending Prescriptions Disp Refills    Semaglutide-Weight Management (WEGOVY) 1.7 MG/0.75ML SOAJ SC injection 3 mL 5     Sig: Inject 1.7 mg into the skin every 7 days

## 2025-04-24 ENCOUNTER — OFFICE VISIT (OUTPATIENT)
Dept: SURGERY | Age: 45
End: 2025-04-24

## 2025-04-24 VITALS
WEIGHT: 297 LBS | BODY MASS INDEX: 39.36 KG/M2 | TEMPERATURE: 97.2 F | HEART RATE: 73 BPM | OXYGEN SATURATION: 98 % | HEIGHT: 73 IN

## 2025-04-24 DIAGNOSIS — Z48.89 ENCOUNTER FOR POSTOPERATIVE CARE: Primary | ICD-10-CM

## 2025-04-24 DIAGNOSIS — Z09 POSTOP CHECK: ICD-10-CM

## 2025-04-24 PROCEDURE — 99024 POSTOP FOLLOW-UP VISIT: CPT | Performed by: SURGERY

## 2025-04-24 NOTE — PROGRESS NOTES
[facility]  [facility] GENERAL SURGERY  [address]  [facility] [address]  [phone]     Patient: João Briones   YOB: 1980   Date: 4/24/2025         Subjective  Date of last surgery:  4/11/2025   POD: 13     Pathology report:   Cholecystitis    João Briones  returns today for a post op visit s/p laparoscopic cholecystectomy.    He states that all of his preoperative symptoms have resolved.  No indigestion.  No nausea or vomiting.  He has occasional/rare diarrhea.  No fevers or chills.    Medications  Current Outpatient Medications   Medication Sig Dispense Refill    Semaglutide-Weight Management (WEGOVY) 1.7 MG/0.75ML SOAJ SC injection Inject 1.7 mg into the skin every 7 days 3 mL 5    ibuprofen (ADVIL;MOTRIN) 200 MG tablet Take 1 tablet by mouth every 6 hours as needed for Pain      montelukast (SINGULAIR) 10 MG tablet Take 1 tablet by mouth nightly 90 tablet 3    hydrOXYzine HCl (ATARAX) 50 MG tablet TAKE 1 TABLET BY MOUTH EVERY 6 HOURS AS NEEDED FOR ANXIETY **MAY MAKE DROWSY** (Patient taking differently: Take 1 tablet by mouth every 4 hours as needed for Itching or Anxiety TAKE 1 TABLET BY MOUTH EVERY 6 HOURS AS NEEDED FOR ANXIETY **MAY MAKE DROWSY**) 60 tablet 11    cetirizine (ZYRTEC) 10 MG tablet Take 1 tablet by mouth daily 90 tablet 3    fluticasone (FLONASE) 50 MCG/ACT nasal spray 2 sprays by Nasal route daily (Patient taking differently: 2 sprays by Nasal route daily as needed for Rhinitis or Allergies) 1 each 5    methocarbamol (ROBAXIN) 500 MG tablet Take 1 tablet by mouth nightly as needed (back pain) 30 tablet 3    pantoprazole (PROTONIX) 40 MG tablet Take 1 tablet by mouth in the morning and at bedtime 180 tablet 3    traZODone (DESYREL) 50 MG tablet TAKE 1 TO 2 TABLETS BY MOUTH EVERY NIGHT AT BEDTIME, MAY CAUSE DROWSINESS (Patient taking differently: Take 1 tablet by mouth nightly TAKE 1 TO 2 TABLETS BY MOUTH EVERY NIGHT AT BEDTIME, MAY

## 2025-05-06 ENCOUNTER — TELEPHONE (OUTPATIENT)
Age: 45
End: 2025-05-06

## 2025-05-06 NOTE — TELEPHONE ENCOUNTER
Patient called stating he is still having issues getting his Wegovy. He has gone without meds x 2 weeks. Patient states he is enrolled in the [facility] weight management program and reached out to them and they aren't seeing any issues on their end. He is not sure what the hold up is.

## 2025-05-06 NOTE — TELEPHONE ENCOUNTER
Previous PA request showed patient was no longer enrolled in the [facility] weight management program, I will try again to get this approved

## 2025-05-09 ENCOUNTER — TELEPHONE (OUTPATIENT)
Age: 45
End: 2025-05-09

## 2025-05-09 NOTE — TELEPHONE ENCOUNTER
Patient called regarding his Wegovy.  Wanting to know if we had received any information as to whether this has been approved or not.  Patient states he is talked to the insurance company and they are just waiting on this.  Will send this to DONOVAN Mcallister, and have her give patient a call.

## 2025-08-30 DIAGNOSIS — Z91.09 ENVIRONMENTAL ALLERGIES: ICD-10-CM

## 2025-09-02 RX ORDER — CETIRIZINE HYDROCHLORIDE 10 MG/1
10 TABLET ORAL DAILY
Qty: 90 TABLET | Refills: 3 | Status: SHIPPED | OUTPATIENT
Start: 2025-09-02

## (undated) DEVICE — Device

## (undated) DEVICE — NEPTUNE E-SEP SMOKE EVACUATION PENCIL, COATED, 70MM BLADE, ROCKER SWITCH: Brand: NEPTUNE E-SEP

## (undated) DEVICE — ELECTRODE ES L33CM DIA5MM STD L HK MPLR LAP APPRCH EZ TO

## (undated) DEVICE — SOLUTION ANTIFOG VIS SYS CLEARIFY LAPSCP

## (undated) DEVICE — INSUFFLATION NEEDLE TO ESTABLISH PNEUMOPERITONEUM.: Brand: INSUFFLATION NEEDLE

## (undated) DEVICE — TROCAR: Brand: KII FIOS FIRST ENTRY

## (undated) DEVICE — BAG SPEC REM 224ML W4XL6IN DIA10MM 1 HND GYN DISP ENDOPCH

## (undated) DEVICE — SYRINGE MED 5ML STD CLR PLAS LUERLOCK TIP N CTRL DISP

## (undated) DEVICE — GLOVE SURG SZ 75 CRM LTX FREE POLYISOPRENE POLYMER BEAD ANTI

## (undated) DEVICE — GLOVE SURG SZ 8 L12IN FNGR THK79MIL GRN LTX FREE

## (undated) DEVICE — SUTURE MONOCRYL SZ 4-0 L18IN ABSRB UD L19MM PS-2 3/8 CIR PRIM Y496G

## (undated) DEVICE — TROCAR: Brand: KII® SLEEVE

## (undated) DEVICE — APPLIER CLP M L L11.4IN DIA10MM ENDOSCP ROT MULT FOR LIG

## (undated) DEVICE — LIQUIBAND RAPID ADHESIVE 36/CS 0.8ML: Brand: MEDLINE

## (undated) DEVICE — GOWN, ORBIS, XXLARGE, STERILE: Brand: MEDLINE

## (undated) DEVICE — MINI ENDOCUT SCISSOR TIP, DISPOSABLE: Brand: RENEW

## (undated) DEVICE — SYRINGE 20ML LL S/C 50